# Patient Record
Sex: MALE | Race: WHITE | Employment: OTHER | URBAN - METROPOLITAN AREA
[De-identification: names, ages, dates, MRNs, and addresses within clinical notes are randomized per-mention and may not be internally consistent; named-entity substitution may affect disease eponyms.]

---

## 2017-05-16 ENCOUNTER — LAB REQUISITION (OUTPATIENT)
Dept: LAB | Facility: HOSPITAL | Age: 82
End: 2017-05-16
Payer: MEDICARE

## 2017-05-16 DIAGNOSIS — R73.01 IMPAIRED FASTING GLUCOSE: ICD-10-CM

## 2017-05-16 DIAGNOSIS — E78.2 MIXED HYPERLIPIDEMIA: ICD-10-CM

## 2017-05-16 DIAGNOSIS — E55.9 VITAMIN D DEFICIENCY: ICD-10-CM

## 2017-05-16 PROCEDURE — 85025 COMPLETE CBC W/AUTO DIFF WBC: CPT | Performed by: INTERNAL MEDICINE

## 2017-05-16 PROCEDURE — 80061 LIPID PANEL: CPT | Performed by: INTERNAL MEDICINE

## 2017-05-16 PROCEDURE — 80053 COMPREHEN METABOLIC PANEL: CPT | Performed by: INTERNAL MEDICINE

## 2017-05-16 PROCEDURE — 83036 HEMOGLOBIN GLYCOSYLATED A1C: CPT | Performed by: INTERNAL MEDICINE

## 2017-05-16 PROCEDURE — 82306 VITAMIN D 25 HYDROXY: CPT | Performed by: INTERNAL MEDICINE

## 2017-05-16 PROCEDURE — 84443 ASSAY THYROID STIM HORMONE: CPT | Performed by: INTERNAL MEDICINE

## 2017-06-28 ENCOUNTER — PRIOR ORIGINAL RECORDS (OUTPATIENT)
Dept: OTHER | Age: 82
End: 2017-06-28

## 2017-08-03 ENCOUNTER — PRIOR ORIGINAL RECORDS (OUTPATIENT)
Dept: OTHER | Age: 82
End: 2017-08-03

## 2017-08-03 ENCOUNTER — MYAURORA ACCOUNT LINK (OUTPATIENT)
Dept: OTHER | Age: 82
End: 2017-08-03

## 2017-08-09 ENCOUNTER — PRIOR ORIGINAL RECORDS (OUTPATIENT)
Dept: OTHER | Age: 82
End: 2017-08-09

## 2017-11-21 ENCOUNTER — LAB REQUISITION (OUTPATIENT)
Dept: LAB | Facility: HOSPITAL | Age: 82
End: 2017-11-21
Payer: MEDICARE

## 2017-11-21 ENCOUNTER — PRIOR ORIGINAL RECORDS (OUTPATIENT)
Dept: OTHER | Age: 82
End: 2017-11-21

## 2017-11-21 DIAGNOSIS — N18.30 CHRONIC KIDNEY DISEASE, STAGE III (MODERATE) (HCC): ICD-10-CM

## 2017-11-21 DIAGNOSIS — R73.01 IMPAIRED FASTING GLUCOSE: ICD-10-CM

## 2017-11-21 DIAGNOSIS — E78.2 MIXED HYPERLIPIDEMIA: ICD-10-CM

## 2017-11-21 PROCEDURE — 80048 BASIC METABOLIC PNL TOTAL CA: CPT | Performed by: INTERNAL MEDICINE

## 2017-11-21 PROCEDURE — 83036 HEMOGLOBIN GLYCOSYLATED A1C: CPT | Performed by: INTERNAL MEDICINE

## 2017-11-21 PROCEDURE — 84460 ALANINE AMINO (ALT) (SGPT): CPT | Performed by: INTERNAL MEDICINE

## 2017-11-21 PROCEDURE — 80061 LIPID PANEL: CPT | Performed by: INTERNAL MEDICINE

## 2017-12-19 LAB
ALT (SGPT): 17 U/L
BUN: 19 MG/DL
CALCIUM: 9 MG/DL
CHLORIDE: 111 MEQ/L
CHOLESTEROL, TOTAL: 115 MG/DL
CREATININE, SERUM: 1.24 MG/DL
GLUCOSE: 119 MG/DL
GLUCOSE: 119 MG/DL
HDL CHOLESTEROL: 26 MG/DL
LDL CHOLESTEROL: 44 MG/DL
POTASSIUM, SERUM: 3.9 MEQ/L
SGPT (ALT): 17 IU/L
SODIUM: 143 MEQ/L
TRIGLYCERIDES: 223 MG/DL

## 2017-12-20 ENCOUNTER — PRIOR ORIGINAL RECORDS (OUTPATIENT)
Dept: OTHER | Age: 82
End: 2017-12-20

## 2018-04-19 ENCOUNTER — LAB REQUISITION (OUTPATIENT)
Dept: LAB | Facility: HOSPITAL | Age: 83
End: 2018-04-19
Payer: MEDICARE

## 2018-04-19 DIAGNOSIS — Z01.89 ENCOUNTER FOR OTHER SPECIFIED SPECIAL EXAMINATIONS: ICD-10-CM

## 2018-04-19 PROCEDURE — 88305 TISSUE EXAM BY PATHOLOGIST: CPT | Performed by: UROLOGY

## 2018-05-22 ENCOUNTER — PRIOR ORIGINAL RECORDS (OUTPATIENT)
Dept: OTHER | Age: 83
End: 2018-05-22

## 2018-05-22 ENCOUNTER — LAB REQUISITION (OUTPATIENT)
Dept: LAB | Facility: HOSPITAL | Age: 83
End: 2018-05-22
Payer: MEDICARE

## 2018-05-22 DIAGNOSIS — N18.30 CHRONIC KIDNEY DISEASE, STAGE III (MODERATE) (HCC): ICD-10-CM

## 2018-05-22 PROCEDURE — 81001 URINALYSIS AUTO W/SCOPE: CPT | Performed by: INTERNAL MEDICINE

## 2018-05-22 PROCEDURE — 82043 UR ALBUMIN QUANTITATIVE: CPT | Performed by: INTERNAL MEDICINE

## 2018-05-22 PROCEDURE — 82570 ASSAY OF URINE CREATININE: CPT | Performed by: INTERNAL MEDICINE

## 2018-05-25 ENCOUNTER — LAB REQUISITION (OUTPATIENT)
Dept: LAB | Facility: HOSPITAL | Age: 83
End: 2018-05-25
Payer: MEDICARE

## 2018-05-25 DIAGNOSIS — N39.0 URINARY TRACT INFECTION: ICD-10-CM

## 2018-05-25 PROCEDURE — 87086 URINE CULTURE/COLONY COUNT: CPT | Performed by: INTERNAL MEDICINE

## 2018-06-15 LAB
ALBUMIN: 3.9 G/DL
ALKALINE PHOSPHATATE(ALK PHOS): 49 IU/L
ALT (SGPT): 15 U/L
AST (SGOT): 23 U/L
BILIRUBIN TOTAL: 0.8 MG/DL
BUN: 18 MG/DL
CALCIUM: 9.1 MG/DL
CHLORIDE: 111 MEQ/L
CHOLESTEROL, TOTAL: 120 MG/DL
CREATININE, SERUM: 1.2 MG/DL
GLOBULIN: 3.6 G/DL
GLUCOSE: 110 MG/DL
GLUCOSE: 110 MG/DL
HDL CHOLESTEROL: 28 MG/DL
LDL CHOLESTEROL: 41 MG/DL
NON-HDL CHOLESTEROL: 92 MG/DL
POTASSIUM, SERUM: 4.1 MEQ/L
PROTEIN, TOTAL: 7.5 G/DL
SGOT (AST): 23 IU/L
SGPT (ALT): 15 IU/L
SODIUM: 142 MEQ/L
THYROID STIMULATING HORMONE: 0.69 MLU/L
TRIGLYCERIDES: 253 MG/DL

## 2018-06-18 ENCOUNTER — PRIOR ORIGINAL RECORDS (OUTPATIENT)
Dept: OTHER | Age: 83
End: 2018-06-18

## 2018-11-29 ENCOUNTER — HOSPITAL ENCOUNTER (OUTPATIENT)
Dept: GENERAL RADIOLOGY | Facility: HOSPITAL | Age: 83
Discharge: HOME | End: 2018-11-29
Attending: INTERNAL MEDICINE
Payer: MEDICARE

## 2018-11-29 DIAGNOSIS — R06.00 DYSPNEA: ICD-10-CM

## 2018-11-29 PROCEDURE — 71046 X-RAY EXAM CHEST 2 VIEWS: CPT | Performed by: INTERNAL MEDICINE

## 2018-12-03 ENCOUNTER — LAB ENCOUNTER (OUTPATIENT)
Dept: LAB | Age: 83
End: 2018-12-03
Attending: INTERNAL MEDICINE
Payer: MEDICARE

## 2018-12-03 DIAGNOSIS — R73.01 IMPAIRED FASTING BLOOD SUGAR: ICD-10-CM

## 2018-12-03 DIAGNOSIS — E78.2 MIXED HYPERLIPIDEMIA: Primary | ICD-10-CM

## 2018-12-03 PROCEDURE — 84460 ALANINE AMINO (ALT) (SGPT): CPT

## 2018-12-03 PROCEDURE — 80061 LIPID PANEL: CPT

## 2018-12-03 PROCEDURE — 36415 COLL VENOUS BLD VENIPUNCTURE: CPT

## 2018-12-03 PROCEDURE — 83036 HEMOGLOBIN GLYCOSYLATED A1C: CPT

## 2018-12-03 PROCEDURE — 84450 TRANSFERASE (AST) (SGOT): CPT

## 2018-12-03 PROCEDURE — 80048 BASIC METABOLIC PNL TOTAL CA: CPT

## 2018-12-07 ENCOUNTER — HOSPITAL ENCOUNTER (OUTPATIENT)
Dept: NUCLEAR MEDICINE | Facility: HOSPITAL | Age: 83
Discharge: HOME | End: 2018-12-07
Attending: INTERNAL MEDICINE
Payer: MEDICARE

## 2018-12-07 ENCOUNTER — HOSPITAL ENCOUNTER (OUTPATIENT)
Dept: CV DIAGNOSTICS | Facility: HOSPITAL | Age: 83
Discharge: HOME | End: 2018-12-07
Attending: INTERNAL MEDICINE
Payer: MEDICARE

## 2018-12-07 ENCOUNTER — HOSPITAL ENCOUNTER (OUTPATIENT)
Dept: RESPIRATORY THERAPY | Facility: HOSPITAL | Age: 83
Discharge: HOME | End: 2018-12-07
Attending: INTERNAL MEDICINE
Payer: MEDICARE

## 2018-12-07 DIAGNOSIS — R06.09 OTHER FORMS OF DYSPNEA: ICD-10-CM

## 2018-12-07 DIAGNOSIS — R06.00 DYSPNEA: ICD-10-CM

## 2018-12-07 PROCEDURE — 78452 HT MUSCLE IMAGE SPECT MULT: CPT | Performed by: INTERNAL MEDICINE

## 2018-12-07 PROCEDURE — 94729 DIFFUSING CAPACITY: CPT

## 2018-12-07 PROCEDURE — 93018 CV STRESS TEST I&R ONLY: CPT | Performed by: INTERNAL MEDICINE

## 2018-12-07 PROCEDURE — 93016 CV STRESS TEST SUPVJ ONLY: CPT | Performed by: INTERNAL MEDICINE

## 2018-12-07 PROCEDURE — 94726 PLETHYSMOGRAPHY LUNG VOLUMES: CPT

## 2018-12-07 PROCEDURE — A4216 STERILE WATER/SALINE, 10 ML: HCPCS

## 2018-12-07 PROCEDURE — 94060 EVALUATION OF WHEEZING: CPT

## 2018-12-07 PROCEDURE — 93017 CV STRESS TEST TRACING ONLY: CPT | Performed by: INTERNAL MEDICINE

## 2018-12-07 RX ORDER — 0.9 % SODIUM CHLORIDE 0.9 %
VIAL (ML) INJECTION
Status: COMPLETED
Start: 2018-12-07 | End: 2018-12-07

## 2018-12-07 RX ADMIN — 0.9 % SODIUM CHLORIDE: 0.9 % VIAL (ML) INJECTION at 13:48:00

## 2018-12-07 NOTE — PROCEDURES
Pulmonary Function Test Results     Impression: Normal spirometry and lung volumes. The DLCO is moderately reduced at 40% of predicted. Clinical correlation is advised.

## 2018-12-10 ENCOUNTER — PRIOR ORIGINAL RECORDS (OUTPATIENT)
Dept: OTHER | Age: 83
End: 2018-12-10

## 2018-12-14 ENCOUNTER — HOSPITAL ENCOUNTER (OUTPATIENT)
Dept: CT IMAGING | Facility: HOSPITAL | Age: 83
Discharge: HOME | End: 2018-12-14
Attending: INTERNAL MEDICINE
Payer: MEDICARE

## 2018-12-14 DIAGNOSIS — R93.89 ABNORMAL CXR: ICD-10-CM

## 2018-12-14 PROCEDURE — 82565 ASSAY OF CREATININE: CPT

## 2018-12-14 PROCEDURE — 71260 CT THORAX DX C+: CPT | Performed by: INTERNAL MEDICINE

## 2018-12-19 ENCOUNTER — MYAURORA ACCOUNT LINK (OUTPATIENT)
Dept: OTHER | Age: 83
End: 2018-12-19

## 2018-12-19 ENCOUNTER — PRIOR ORIGINAL RECORDS (OUTPATIENT)
Dept: OTHER | Age: 83
End: 2018-12-19

## 2018-12-26 PROBLEM — J96.11 CHRONIC RESPIRATORY FAILURE WITH HYPOXIA (HCC): Status: ACTIVE | Noted: 2018-12-26

## 2018-12-26 PROBLEM — J43.9 PULMONARY EMPHYSEMA, UNSPECIFIED EMPHYSEMA TYPE (HCC): Status: ACTIVE | Noted: 2018-12-26

## 2018-12-26 PROBLEM — R91.1 PULMONARY NODULE: Status: ACTIVE | Noted: 2018-12-26

## 2019-01-01 ENCOUNTER — APPOINTMENT (OUTPATIENT)
Dept: CARDIAC REHAB | Facility: HOSPITAL | Age: 84
End: 2019-01-01
Attending: INTERNAL MEDICINE
Payer: MEDICARE

## 2019-01-01 ENCOUNTER — APPOINTMENT (OUTPATIENT)
Dept: GENERAL RADIOLOGY | Facility: HOSPITAL | Age: 84
End: 2019-01-01
Attending: EMERGENCY MEDICINE
Payer: MEDICARE

## 2019-01-01 ENCOUNTER — HOSPITAL ENCOUNTER (EMERGENCY)
Facility: HOSPITAL | Age: 84
Discharge: HOME | End: 2019-01-01
Attending: EMERGENCY MEDICINE
Payer: MEDICARE

## 2019-01-01 ENCOUNTER — APPOINTMENT (OUTPATIENT)
Dept: GENERAL RADIOLOGY | Facility: HOSPITAL | Age: 84
DRG: 251 | End: 2019-01-01
Attending: EMERGENCY MEDICINE
Payer: MEDICARE

## 2019-01-01 ENCOUNTER — HOSPITAL ENCOUNTER (INPATIENT)
Facility: HOSPITAL | Age: 84
LOS: 2 days | Discharge: HOME | DRG: 251 | End: 2019-01-01
Attending: EMERGENCY MEDICINE | Admitting: INTERNAL MEDICINE
Payer: MEDICARE

## 2019-01-01 ENCOUNTER — APPOINTMENT (OUTPATIENT)
Dept: INTERVENTIONAL RADIOLOGY/VASCULAR | Facility: HOSPITAL | Age: 84
DRG: 251 | End: 2019-01-01
Attending: INTERNAL MEDICINE
Payer: MEDICARE

## 2019-01-01 ENCOUNTER — TELEPHONE (OUTPATIENT)
Dept: CARDIAC REHAB | Facility: HOSPITAL | Age: 84
End: 2019-01-01

## 2019-01-01 ENCOUNTER — EXTERNAL RECORD (OUTPATIENT)
Dept: HEALTH INFORMATION MANAGEMENT | Facility: OTHER | Age: 84
End: 2019-01-01

## 2019-01-01 VITALS
DIASTOLIC BLOOD PRESSURE: 60 MMHG | SYSTOLIC BLOOD PRESSURE: 110 MMHG | OXYGEN SATURATION: 96 % | WEIGHT: 230 LBS | BODY MASS INDEX: 30.48 KG/M2 | RESPIRATION RATE: 18 BRPM | HEIGHT: 73 IN | HEART RATE: 63 BPM | TEMPERATURE: 98 F

## 2019-01-01 VITALS
DIASTOLIC BLOOD PRESSURE: 80 MMHG | WEIGHT: 237 LBS | HEART RATE: 70 BPM | BODY MASS INDEX: 31.41 KG/M2 | OXYGEN SATURATION: 94 % | TEMPERATURE: 99 F | RESPIRATION RATE: 18 BRPM | HEIGHT: 73 IN | SYSTOLIC BLOOD PRESSURE: 130 MMHG

## 2019-01-01 VITALS
TEMPERATURE: 98 F | RESPIRATION RATE: 18 BRPM | DIASTOLIC BLOOD PRESSURE: 67 MMHG | SYSTOLIC BLOOD PRESSURE: 138 MMHG | HEART RATE: 68 BPM | WEIGHT: 238.31 LBS | BODY MASS INDEX: 31 KG/M2 | OXYGEN SATURATION: 93 %

## 2019-01-01 DIAGNOSIS — R53.1 WEAKNESS GENERALIZED: Primary | ICD-10-CM

## 2019-01-01 DIAGNOSIS — J44.1 COPD EXACERBATION (HCC): Primary | ICD-10-CM

## 2019-01-01 DIAGNOSIS — I21.4 NSTEMI (NON-ST ELEVATED MYOCARDIAL INFARCTION) (HCC): ICD-10-CM

## 2019-01-01 DIAGNOSIS — M25.552 HIP PAIN, LEFT: Primary | ICD-10-CM

## 2019-01-01 PROCEDURE — 85027 COMPLETE CBC AUTOMATED: CPT | Performed by: INTERNAL MEDICINE

## 2019-01-01 PROCEDURE — 94640 AIRWAY INHALATION TREATMENT: CPT

## 2019-01-01 PROCEDURE — 80048 BASIC METABOLIC PNL TOTAL CA: CPT | Performed by: INTERNAL MEDICINE

## 2019-01-01 PROCEDURE — 93005 ELECTROCARDIOGRAM TRACING: CPT

## 2019-01-01 PROCEDURE — 92978 ENDOLUMINL IVUS OCT C 1ST: CPT

## 2019-01-01 PROCEDURE — B2111ZZ FLUOROSCOPY OF MULTIPLE CORONARY ARTERIES USING LOW OSMOLAR CONTRAST: ICD-10-PCS | Performed by: INTERNAL MEDICINE

## 2019-01-01 PROCEDURE — 85347 COAGULATION TIME ACTIVATED: CPT

## 2019-01-01 PROCEDURE — 85730 THROMBOPLASTIN TIME PARTIAL: CPT | Performed by: EMERGENCY MEDICINE

## 2019-01-01 PROCEDURE — 73502 X-RAY EXAM HIP UNI 2-3 VIEWS: CPT | Performed by: EMERGENCY MEDICINE

## 2019-01-01 PROCEDURE — 93798 PHYS/QHP OP CAR RHAB W/ECG: CPT

## 2019-01-01 PROCEDURE — 96365 THER/PROPH/DIAG IV INF INIT: CPT

## 2019-01-01 PROCEDURE — 99152 MOD SED SAME PHYS/QHP 5/>YRS: CPT

## 2019-01-01 PROCEDURE — 99285 EMERGENCY DEPT VISIT HI MDM: CPT

## 2019-01-01 PROCEDURE — 71045 X-RAY EXAM CHEST 1 VIEW: CPT | Performed by: EMERGENCY MEDICINE

## 2019-01-01 PROCEDURE — B2131ZZ FLUOROSCOPY OF MULTIPLE CORONARY ARTERY BYPASS GRAFTS USING LOW OSMOLAR CONTRAST: ICD-10-PCS | Performed by: INTERNAL MEDICINE

## 2019-01-01 PROCEDURE — 80048 BASIC METABOLIC PNL TOTAL CA: CPT | Performed by: EMERGENCY MEDICINE

## 2019-01-01 PROCEDURE — 83880 ASSAY OF NATRIURETIC PEPTIDE: CPT | Performed by: EMERGENCY MEDICINE

## 2019-01-01 PROCEDURE — 99284 EMERGENCY DEPT VISIT MOD MDM: CPT

## 2019-01-01 PROCEDURE — 93010 ELECTROCARDIOGRAM REPORT: CPT | Performed by: EMERGENCY MEDICINE

## 2019-01-01 PROCEDURE — B2151ZZ FLUOROSCOPY OF LEFT HEART USING LOW OSMOLAR CONTRAST: ICD-10-PCS | Performed by: INTERNAL MEDICINE

## 2019-01-01 PROCEDURE — 96372 THER/PROPH/DIAG INJ SC/IM: CPT

## 2019-01-01 PROCEDURE — 85025 COMPLETE CBC W/AUTO DIFF WBC: CPT | Performed by: EMERGENCY MEDICINE

## 2019-01-01 PROCEDURE — 80061 LIPID PANEL: CPT | Performed by: EMERGENCY MEDICINE

## 2019-01-01 PROCEDURE — 99291 CRITICAL CARE FIRST HOUR: CPT

## 2019-01-01 PROCEDURE — B240ZZ3 ULTRASONOGRAPHY OF SINGLE CORONARY ARTERY, INTRAVASCULAR: ICD-10-PCS | Performed by: INTERNAL MEDICINE

## 2019-01-01 PROCEDURE — 99153 MOD SED SAME PHYS/QHP EA: CPT

## 2019-01-01 PROCEDURE — 84484 ASSAY OF TROPONIN QUANT: CPT | Performed by: EMERGENCY MEDICINE

## 2019-01-01 PROCEDURE — 85025 COMPLETE CBC W/AUTO DIFF WBC: CPT

## 2019-01-01 PROCEDURE — 93459 L HRT ART/GRFT ANGIO: CPT

## 2019-01-01 PROCEDURE — B2181ZZ FLUOROSCOPY OF LEFT INTERNAL MAMMARY BYPASS GRAFT USING LOW OSMOLAR CONTRAST: ICD-10-PCS | Performed by: INTERNAL MEDICINE

## 2019-01-01 PROCEDURE — 02703ZZ DILATION OF CORONARY ARTERY, ONE ARTERY, PERCUTANEOUS APPROACH: ICD-10-PCS | Performed by: INTERNAL MEDICINE

## 2019-01-01 PROCEDURE — 96374 THER/PROPH/DIAG INJ IV PUSH: CPT

## 2019-01-01 PROCEDURE — 92920 PRQ TRLUML C ANGIOP 1ART&/BR: CPT

## 2019-01-01 PROCEDURE — 4A023N7 MEASUREMENT OF CARDIAC SAMPLING AND PRESSURE, LEFT HEART, PERCUTANEOUS APPROACH: ICD-10-PCS | Performed by: INTERNAL MEDICINE

## 2019-01-01 RX ORDER — ASPIRIN 81 MG/1
324 TABLET, CHEWABLE ORAL ONCE
Status: COMPLETED | OUTPATIENT
Start: 2019-01-01 | End: 2019-01-01

## 2019-01-01 RX ORDER — SODIUM CHLORIDE 9 MG/ML
INJECTION, SOLUTION INTRAVENOUS
Status: COMPLETED
Start: 2019-01-01 | End: 2019-01-01

## 2019-01-01 RX ORDER — MIDAZOLAM HYDROCHLORIDE 1 MG/ML
INJECTION INTRAMUSCULAR; INTRAVENOUS
Status: COMPLETED
Start: 2019-01-01 | End: 2019-01-01

## 2019-01-01 RX ORDER — TICAGRELOR 90 MG/1
TABLET ORAL
Qty: 180 TABLET | OUTPATIENT
Start: 2019-01-01

## 2019-01-01 RX ORDER — LIDOCAINE HYDROCHLORIDE 20 MG/ML
INJECTION, SOLUTION EPIDURAL; INFILTRATION; INTRACAUDAL; PERINEURAL
Status: COMPLETED
Start: 2019-01-01 | End: 2019-01-01

## 2019-01-01 RX ORDER — HEPARIN SODIUM 1000 [USP'U]/ML
INJECTION, SOLUTION INTRAVENOUS; SUBCUTANEOUS
Status: COMPLETED
Start: 2019-01-01 | End: 2019-01-01

## 2019-01-01 RX ORDER — ACETAMINOPHEN 325 MG/1
650 TABLET ORAL EVERY 6 HOURS PRN
Status: DISCONTINUED | OUTPATIENT
Start: 2019-01-01 | End: 2019-01-01

## 2019-01-01 RX ORDER — ASPIRIN 81 MG/1
81 TABLET ORAL DAILY
Status: DISCONTINUED | OUTPATIENT
Start: 2019-01-01 | End: 2019-01-01

## 2019-01-01 RX ORDER — LIDOCAINE 50 MG/G
1 PATCH TOPICAL EVERY 24 HOURS
Qty: 7 PATCH | Refills: 0 | Status: SHIPPED | OUTPATIENT
Start: 2019-01-01 | End: 2019-01-01

## 2019-01-01 RX ORDER — ORPHENADRINE CITRATE 100 MG/1
100 TABLET, EXTENDED RELEASE ORAL 2 TIMES DAILY
Qty: 20 TABLET | Refills: 0 | Status: SHIPPED | OUTPATIENT
Start: 2019-01-01 | End: 2019-01-01

## 2019-01-01 RX ORDER — ASPIRIN 81 MG/1
81 TABLET, CHEWABLE ORAL DAILY
Status: DISCONTINUED | OUTPATIENT
Start: 2019-01-01 | End: 2019-01-01

## 2019-01-01 RX ORDER — ATORVASTATIN CALCIUM 40 MG/1
80 TABLET, FILM COATED ORAL NIGHTLY
Status: DISCONTINUED | OUTPATIENT
Start: 2019-01-01 | End: 2019-01-01

## 2019-01-01 RX ORDER — MELOXICAM 7.5 MG/1
7.5 TABLET ORAL DAILY PRN
Qty: 7 TABLET | Refills: 0 | Status: SHIPPED | OUTPATIENT
Start: 2019-01-01 | End: 2019-01-01

## 2019-01-01 RX ORDER — PREDNISONE 20 MG/1
40 TABLET ORAL DAILY
Qty: 8 TABLET | Refills: 0 | Status: SHIPPED | OUTPATIENT
Start: 2019-01-01 | End: 2019-01-01

## 2019-01-01 RX ORDER — ISOSORBIDE MONONITRATE 30 MG/1
30 TABLET, EXTENDED RELEASE ORAL DAILY
Status: DISCONTINUED | OUTPATIENT
Start: 2019-01-01 | End: 2019-01-01

## 2019-01-01 RX ORDER — SODIUM CHLORIDE 9 MG/ML
INJECTION, SOLUTION INTRAVENOUS
Status: DISPENSED
Start: 2019-01-01 | End: 2019-01-01

## 2019-01-01 RX ORDER — PREDNISONE 20 MG/1
40 TABLET ORAL ONCE
Status: COMPLETED | OUTPATIENT
Start: 2019-01-01 | End: 2019-01-01

## 2019-01-01 RX ORDER — HEPARIN SODIUM 1000 [USP'U]/ML
5000 INJECTION, SOLUTION INTRAVENOUS; SUBCUTANEOUS ONCE
Status: COMPLETED | OUTPATIENT
Start: 2019-01-01 | End: 2019-01-01

## 2019-01-01 RX ORDER — METHYLPREDNISOLONE SODIUM SUCCINATE 125 MG/2ML
60 INJECTION, POWDER, LYOPHILIZED, FOR SOLUTION INTRAMUSCULAR; INTRAVENOUS ONCE
Status: COMPLETED | OUTPATIENT
Start: 2019-01-01 | End: 2019-01-01

## 2019-01-01 RX ORDER — ISOSORBIDE MONONITRATE 30 MG/1
30 TABLET, EXTENDED RELEASE ORAL DAILY
Qty: 30 TABLET | Refills: 5 | Status: SHIPPED | OUTPATIENT
Start: 2019-01-01

## 2019-01-01 RX ORDER — ATORVASTATIN CALCIUM 80 MG/1
80 TABLET, FILM COATED ORAL NIGHTLY
Qty: 90 TABLET | Refills: 1 | Status: SHIPPED | OUTPATIENT
Start: 2019-01-01

## 2019-01-01 RX ORDER — HEPARIN SODIUM AND DEXTROSE 10000; 5 [USP'U]/100ML; G/100ML
INJECTION INTRAVENOUS CONTINUOUS
Status: DISCONTINUED | OUTPATIENT
Start: 2019-01-01 | End: 2019-01-01 | Stop reason: ALTCHOICE

## 2019-01-01 RX ORDER — LISINOPRIL 5 MG/1
5 TABLET ORAL DAILY
Status: DISCONTINUED | OUTPATIENT
Start: 2019-01-01 | End: 2019-01-01

## 2019-01-01 RX ORDER — IPRATROPIUM BROMIDE AND ALBUTEROL SULFATE 2.5; .5 MG/3ML; MG/3ML
3 SOLUTION RESPIRATORY (INHALATION) ONCE
Status: COMPLETED | OUTPATIENT
Start: 2019-01-01 | End: 2019-01-01

## 2019-01-01 RX ORDER — DIPHENHYDRAMINE HCL 25 MG
25 CAPSULE ORAL EVERY 6 HOURS PRN
Status: DISCONTINUED | OUTPATIENT
Start: 2019-01-01 | End: 2019-01-01

## 2019-01-01 RX ORDER — KETOROLAC TROMETHAMINE 30 MG/ML
30 INJECTION, SOLUTION INTRAMUSCULAR; INTRAVENOUS ONCE
Status: COMPLETED | OUTPATIENT
Start: 2019-01-01 | End: 2019-01-01

## 2019-01-01 RX ORDER — SODIUM CHLORIDE 9 MG/ML
INJECTION, SOLUTION INTRAVENOUS CONTINUOUS
Status: DISCONTINUED | OUTPATIENT
Start: 2019-01-01 | End: 2019-01-01

## 2019-01-01 RX ORDER — IPRATROPIUM BROMIDE AND ALBUTEROL SULFATE 2.5; .5 MG/3ML; MG/3ML
3 SOLUTION RESPIRATORY (INHALATION) EVERY 8 HOURS
Status: COMPLETED | OUTPATIENT
Start: 2019-01-01 | End: 2019-01-01

## 2019-01-01 RX ORDER — HEPARIN SODIUM AND DEXTROSE 10000; 5 [USP'U]/100ML; G/100ML
1000 INJECTION INTRAVENOUS ONCE
Status: DISCONTINUED | OUTPATIENT
Start: 2019-01-01 | End: 2019-01-01

## 2019-01-01 RX ORDER — ORPHENADRINE CITRATE 30 MG/ML
60 INJECTION INTRAMUSCULAR; INTRAVENOUS ONCE
Status: COMPLETED | OUTPATIENT
Start: 2019-01-01 | End: 2019-01-01

## 2019-01-01 RX ORDER — MORPHINE SULFATE 4 MG/ML
2 INJECTION, SOLUTION INTRAMUSCULAR; INTRAVENOUS ONCE
Status: COMPLETED | OUTPATIENT
Start: 2019-01-01 | End: 2019-01-01

## 2019-01-01 RX ORDER — SODIUM CHLORIDE 0.9 % (FLUSH) 0.9 %
3 SYRINGE (ML) INJECTION AS NEEDED
Status: DISCONTINUED | OUTPATIENT
Start: 2019-01-01 | End: 2019-01-01

## 2019-01-01 RX ORDER — ASPIRIN 81 MG/1
TABLET, CHEWABLE ORAL
Status: DISPENSED
Start: 2019-01-01 | End: 2019-01-01

## 2019-01-01 RX ORDER — SODIUM CHLORIDE 9 MG/ML
INJECTION, SOLUTION INTRAVENOUS CONTINUOUS
Status: ACTIVE | OUTPATIENT
Start: 2019-01-01 | End: 2019-01-01

## 2019-01-01 RX ORDER — ATORVASTATIN CALCIUM 20 MG/1
20 TABLET, FILM COATED ORAL NIGHTLY
Status: DISCONTINUED | OUTPATIENT
Start: 2019-01-01 | End: 2019-01-01

## 2019-01-01 RX ORDER — METOPROLOL SUCCINATE 50 MG/1
50 TABLET, EXTENDED RELEASE ORAL DAILY
Status: DISCONTINUED | OUTPATIENT
Start: 2019-01-01 | End: 2019-01-01

## 2019-01-01 RX ORDER — FUROSEMIDE 10 MG/ML
20 INJECTION INTRAMUSCULAR; INTRAVENOUS ONCE
Status: COMPLETED | OUTPATIENT
Start: 2019-01-01 | End: 2019-01-01

## 2019-01-31 ENCOUNTER — APPOINTMENT (OUTPATIENT)
Dept: GENERAL RADIOLOGY | Facility: HOSPITAL | Age: 84
DRG: 247 | End: 2019-01-31
Payer: MEDICARE

## 2019-01-31 ENCOUNTER — PRIOR ORIGINAL RECORDS (OUTPATIENT)
Dept: OTHER | Age: 84
End: 2019-01-31

## 2019-01-31 ENCOUNTER — HOSPITAL ENCOUNTER (INPATIENT)
Facility: HOSPITAL | Age: 84
LOS: 2 days | Discharge: HOME | DRG: 247 | End: 2019-02-02
Attending: EMERGENCY MEDICINE | Admitting: INTERNAL MEDICINE
Payer: MEDICARE

## 2019-01-31 DIAGNOSIS — R09.02 HYPOXIA: ICD-10-CM

## 2019-01-31 DIAGNOSIS — R77.8 ELEVATED TROPONIN: ICD-10-CM

## 2019-01-31 DIAGNOSIS — J44.1 COPD EXACERBATION (HCC): Primary | ICD-10-CM

## 2019-01-31 PROBLEM — R79.89 ELEVATED TROPONIN: Status: ACTIVE | Noted: 2019-01-31

## 2019-01-31 LAB
ALBUMIN SERPL BCP-MCNC: 3.7 G/DL (ref 3.5–4.8)
ALP SERPL-CCNC: 47 U/L (ref 32–100)
ALT SERPL-CCNC: 15 U/L (ref 17–63)
ANION GAP SERPL CALC-SCNC: 10 MMOL/L (ref 0–18)
APTT PPP: 28.8 SECONDS (ref 23.2–35.3)
APTT PPP: 57.5 SECONDS (ref 23.2–35.3)
AST SERPL-CCNC: 34 U/L (ref 15–41)
BASOPHILS # BLD AUTO: 0.07 X10(3) UL (ref 0–0.2)
BASOPHILS NFR BLD AUTO: 1 %
BILIRUB DIRECT SERPL-MCNC: 0.1 MG/DL (ref 0–0.2)
BILIRUB SERPL-MCNC: 0.9 MG/DL (ref 0.3–1.2)
BNP SERPL-MCNC: 178 PG/ML (ref 0–100)
BUN SERPL-MCNC: 21 MG/DL (ref 8–20)
BUN/CREAT SERPL: 15.4 (ref 10–20)
CALCIUM SERPL-MCNC: 9 MG/DL (ref 8.5–10.5)
CHLORIDE SERPL-SCNC: 111 MMOL/L (ref 95–110)
CHOLEST SERPL-MCNC: 110 MG/DL (ref 110–200)
CO2 SERPL-SCNC: 19 MMOL/L (ref 22–32)
CREAT SERPL-MCNC: 1.36 MG/DL (ref 0.5–1.5)
DEPRECATED RDW RBC AUTO: 49.9 FL (ref 35.1–46.3)
EOSINOPHIL # BLD AUTO: 0.44 X10(3) UL (ref 0–0.7)
EOSINOPHIL NFR BLD AUTO: 6.2 %
ERYTHROCYTE [DISTWIDTH] IN BLOOD BY AUTOMATED COUNT: 13.6 % (ref 11–15)
GLUCOSE SERPL-MCNC: 141 MG/DL (ref 70–99)
HCT VFR BLD AUTO: 49 % (ref 39–53)
HDLC SERPL-MCNC: 26 MG/DL
HGB BLD-MCNC: 15.9 G/DL (ref 13–17.5)
IMM GRANULOCYTES # BLD AUTO: 0.05 X10(3) UL (ref 0–1)
IMM GRANULOCYTES NFR BLD: 0.7 %
LDLC SERPL CALC-MCNC: 36 MG/DL (ref 0–99)
LYMPHOCYTES # BLD AUTO: 1.67 X10(3) UL (ref 1–4)
LYMPHOCYTES NFR BLD AUTO: 23.7 %
MCH RBC QN AUTO: 32.4 PG (ref 26–34)
MCHC RBC AUTO-ENTMCNC: 32.4 G/DL (ref 31–37)
MCV RBC AUTO: 99.8 FL (ref 80–100)
MONOCYTES # BLD AUTO: 0.59 X10(3) UL (ref 0.1–1)
MONOCYTES NFR BLD AUTO: 8.4 %
NEUTROPHILS # BLD AUTO: 4.23 X10 (3) UL (ref 1.5–7.7)
NEUTROPHILS # BLD AUTO: 4.23 X10(3) UL (ref 1.5–7.7)
NEUTROPHILS NFR BLD AUTO: 60 %
NONHDLC SERPL-MCNC: 84 MG/DL
OSMOLALITY UR CALC.SUM OF ELEC: 295 MOSM/KG (ref 275–295)
PLATELET # BLD AUTO: 186 10(3)UL (ref 150–450)
POTASSIUM SERPL-SCNC: 4.1 MMOL/L (ref 3.3–5.1)
PROT SERPL-MCNC: 7 G/DL (ref 5.9–8.4)
RBC # BLD AUTO: 4.91 X10(6)UL (ref 3.8–5.8)
SODIUM SERPL-SCNC: 140 MMOL/L (ref 136–144)
TRIGL SERPL-MCNC: 239 MG/DL (ref 1–149)
TROPONIN I SERPL-MCNC: 0.11 NG/ML (ref ?–0.03)
WBC # BLD AUTO: 7.1 X10(3) UL (ref 4–11)

## 2019-01-31 PROCEDURE — 85025 COMPLETE CBC W/AUTO DIFF WBC: CPT | Performed by: EMERGENCY MEDICINE

## 2019-01-31 PROCEDURE — 80061 LIPID PANEL: CPT | Performed by: EMERGENCY MEDICINE

## 2019-01-31 PROCEDURE — 93010 ELECTROCARDIOGRAM REPORT: CPT | Performed by: INTERNAL MEDICINE

## 2019-01-31 PROCEDURE — 93005 ELECTROCARDIOGRAM TRACING: CPT

## 2019-01-31 PROCEDURE — 71045 X-RAY EXAM CHEST 1 VIEW: CPT | Performed by: EMERGENCY MEDICINE

## 2019-01-31 PROCEDURE — 99285 EMERGENCY DEPT VISIT HI MDM: CPT

## 2019-01-31 PROCEDURE — 83880 ASSAY OF NATRIURETIC PEPTIDE: CPT | Performed by: EMERGENCY MEDICINE

## 2019-01-31 PROCEDURE — 80076 HEPATIC FUNCTION PANEL: CPT | Performed by: EMERGENCY MEDICINE

## 2019-01-31 PROCEDURE — 84484 ASSAY OF TROPONIN QUANT: CPT | Performed by: EMERGENCY MEDICINE

## 2019-01-31 PROCEDURE — 80048 BASIC METABOLIC PNL TOTAL CA: CPT | Performed by: EMERGENCY MEDICINE

## 2019-01-31 PROCEDURE — 96374 THER/PROPH/DIAG INJ IV PUSH: CPT

## 2019-01-31 PROCEDURE — 85730 THROMBOPLASTIN TIME PARTIAL: CPT | Performed by: INTERNAL MEDICINE

## 2019-01-31 PROCEDURE — 94644 CONT INHLJ TX 1ST HOUR: CPT

## 2019-01-31 PROCEDURE — 93010 ELECTROCARDIOGRAM REPORT: CPT | Performed by: EMERGENCY MEDICINE

## 2019-01-31 PROCEDURE — 85730 THROMBOPLASTIN TIME PARTIAL: CPT | Performed by: EMERGENCY MEDICINE

## 2019-01-31 RX ORDER — SODIUM CHLORIDE 9 MG/ML
1 INJECTION, SOLUTION INTRAVENOUS CONTINUOUS
Status: DISCONTINUED | OUTPATIENT
Start: 2019-01-31 | End: 2019-02-01

## 2019-01-31 RX ORDER — HEPARIN SODIUM 1000 [USP'U]/ML
INJECTION, SOLUTION INTRAVENOUS; SUBCUTANEOUS
Status: COMPLETED
Start: 2019-01-31 | End: 2019-01-31

## 2019-01-31 RX ORDER — IPRATROPIUM BROMIDE AND ALBUTEROL SULFATE 2.5; .5 MG/3ML; MG/3ML
3 SOLUTION RESPIRATORY (INHALATION) EVERY 6 HOURS PRN
Status: DISCONTINUED | OUTPATIENT
Start: 2019-01-31 | End: 2019-02-02

## 2019-01-31 RX ORDER — LISINOPRIL 5 MG/1
5 TABLET ORAL DAILY
Status: DISCONTINUED | OUTPATIENT
Start: 2019-02-01 | End: 2019-02-02

## 2019-01-31 RX ORDER — PRAVASTATIN SODIUM 20 MG
20 TABLET ORAL NIGHTLY
Status: DISCONTINUED | OUTPATIENT
Start: 2019-01-31 | End: 2019-01-31

## 2019-01-31 RX ORDER — ALBUTEROL SULFATE 2.5 MG/3ML
5 SOLUTION RESPIRATORY (INHALATION) CONTINUOUS
Status: DISCONTINUED | OUTPATIENT
Start: 2019-01-31 | End: 2019-02-02

## 2019-01-31 RX ORDER — ASPIRIN 81 MG/1
324 TABLET, CHEWABLE ORAL ONCE
Status: COMPLETED | OUTPATIENT
Start: 2019-01-31 | End: 2019-01-31

## 2019-01-31 RX ORDER — PREDNISONE 20 MG/1
40 TABLET ORAL ONCE
Status: COMPLETED | OUTPATIENT
Start: 2019-01-31 | End: 2019-01-31

## 2019-01-31 RX ORDER — DOCUSATE SODIUM 100 MG/1
100 CAPSULE, LIQUID FILLED ORAL 2 TIMES DAILY
Status: DISCONTINUED | OUTPATIENT
Start: 2019-01-31 | End: 2019-02-02

## 2019-01-31 RX ORDER — ASPIRIN 81 MG/1
324 TABLET, CHEWABLE ORAL ONCE
Status: COMPLETED | OUTPATIENT
Start: 2019-01-31 | End: 2019-02-01

## 2019-01-31 RX ORDER — METOCLOPRAMIDE HYDROCHLORIDE 5 MG/ML
10 INJECTION INTRAMUSCULAR; INTRAVENOUS EVERY 8 HOURS PRN
Status: DISCONTINUED | OUTPATIENT
Start: 2019-01-31 | End: 2019-02-02

## 2019-01-31 RX ORDER — PRAVASTATIN SODIUM 20 MG
20 TABLET ORAL NIGHTLY
Status: DISCONTINUED | OUTPATIENT
Start: 2019-01-31 | End: 2019-02-02

## 2019-01-31 RX ORDER — ACETAMINOPHEN 325 MG/1
650 TABLET ORAL ONCE
Status: COMPLETED | OUTPATIENT
Start: 2019-01-31 | End: 2019-01-31

## 2019-01-31 RX ORDER — ACETAMINOPHEN 325 MG/1
650 TABLET ORAL EVERY 6 HOURS PRN
Status: DISCONTINUED | OUTPATIENT
Start: 2019-01-31 | End: 2019-02-02

## 2019-01-31 RX ORDER — POLYETHYLENE GLYCOL 3350 17 G/17G
17 POWDER, FOR SOLUTION ORAL DAILY PRN
Status: DISCONTINUED | OUTPATIENT
Start: 2019-01-31 | End: 2019-02-02

## 2019-01-31 RX ORDER — PREDNISONE 20 MG/1
40 TABLET ORAL
Status: DISCONTINUED | OUTPATIENT
Start: 2019-02-01 | End: 2019-02-02

## 2019-01-31 RX ORDER — HEPARIN SODIUM 10000 [USP'U]/100ML
INJECTION, SOLUTION INTRAVENOUS
Status: COMPLETED
Start: 2019-01-31 | End: 2019-01-31

## 2019-01-31 RX ORDER — ALBUTEROL SULFATE 90 UG/1
2 AEROSOL, METERED RESPIRATORY (INHALATION) EVERY 6 HOURS PRN
Status: DISCONTINUED | OUTPATIENT
Start: 2019-01-31 | End: 2019-02-02

## 2019-01-31 RX ORDER — METOPROLOL SUCCINATE 50 MG/1
50 TABLET, EXTENDED RELEASE ORAL DAILY
Status: DISCONTINUED | OUTPATIENT
Start: 2019-02-01 | End: 2019-02-02

## 2019-01-31 RX ORDER — HEPARIN SODIUM AND DEXTROSE 10000; 5 [USP'U]/100ML; G/100ML
INJECTION INTRAVENOUS CONTINUOUS
Status: DISCONTINUED | OUTPATIENT
Start: 2019-01-31 | End: 2019-02-01

## 2019-01-31 RX ORDER — SODIUM PHOSPHATE, DIBASIC AND SODIUM PHOSPHATE, MONOBASIC 7; 19 G/133ML; G/133ML
1 ENEMA RECTAL ONCE AS NEEDED
Status: DISCONTINUED | OUTPATIENT
Start: 2019-01-31 | End: 2019-02-02

## 2019-01-31 RX ORDER — CHLORHEXIDINE GLUCONATE 4 G/100ML
30 SOLUTION TOPICAL
Status: COMPLETED | OUTPATIENT
Start: 2019-02-01 | End: 2019-02-01

## 2019-01-31 RX ORDER — ONDANSETRON 2 MG/ML
4 INJECTION INTRAMUSCULAR; INTRAVENOUS EVERY 6 HOURS PRN
Status: DISCONTINUED | OUTPATIENT
Start: 2019-01-31 | End: 2019-02-02

## 2019-01-31 RX ORDER — SODIUM CHLORIDE 9 MG/ML
INJECTION, SOLUTION INTRAVENOUS CONTINUOUS
Status: DISCONTINUED | OUTPATIENT
Start: 2019-01-31 | End: 2019-02-01

## 2019-01-31 RX ORDER — SODIUM CHLORIDE 0.9 % (FLUSH) 0.9 %
3 SYRINGE (ML) INJECTION AS NEEDED
Status: DISCONTINUED | OUTPATIENT
Start: 2019-01-31 | End: 2019-02-02

## 2019-01-31 RX ORDER — RUFINAMIDE 40 MG/ML
1 SUSPENSION ORAL DAILY
Status: DISCONTINUED | OUTPATIENT
Start: 2019-02-01 | End: 2019-02-02

## 2019-01-31 RX ORDER — HEPARIN SODIUM 1000 [USP'U]/ML
5000 INJECTION, SOLUTION INTRAVENOUS; SUBCUTANEOUS ONCE
Status: COMPLETED | OUTPATIENT
Start: 2019-01-31 | End: 2019-01-31

## 2019-01-31 RX ORDER — HEPARIN SODIUM AND DEXTROSE 10000; 5 [USP'U]/100ML; G/100ML
1000 INJECTION INTRAVENOUS ONCE
Status: COMPLETED | OUTPATIENT
Start: 2019-01-31 | End: 2019-02-01

## 2019-01-31 RX ORDER — METOPROLOL SUCCINATE 50 MG/1
50 TABLET, EXTENDED RELEASE ORAL DAILY
COMMUNITY

## 2019-01-31 RX ORDER — ASPIRIN 325 MG
325 TABLET, DELAYED RELEASE (ENTERIC COATED) ORAL DAILY
Status: DISCONTINUED | OUTPATIENT
Start: 2019-02-01 | End: 2019-02-01

## 2019-01-31 RX ORDER — BISACODYL 10 MG
10 SUPPOSITORY, RECTAL RECTAL
Status: DISCONTINUED | OUTPATIENT
Start: 2019-01-31 | End: 2019-02-02

## 2019-01-31 NOTE — ED INITIAL ASSESSMENT (HPI)
Samara Yan arrives via EMS--patient went outside to get his newspaper at the end of the driveway and felt really short of breath and \"took his breath away\"    Denies pain.  Uses 5 L 02 at home

## 2019-01-31 NOTE — HISTORICAL OFFICE NOTE
Adarsh Precise  : 10/04/1935  ACCOUNT:  [id number]  [phone]  PCP: Dr. Steph Philip     TODAY'S DATE: 2018  DICTATED BY:  [Dr. Mauricio Esparza: [Followup of .  CAD, of native vessels, Followup of Carotid artery stenosis mult vesse prescriptions see below    VITAL SIGNS: [B/P - 132/60 , Pulse - 70, Weight -    0, Height -   71 , BMI - N/A ]    CONS: well developed, well nourished. WEIGHT: BMI parameters reviewed and discussed. NECK: jugular venous pressure not elevated.  RESP: clear t [id number]     as needed                                06/29/09 Pravastatin Sodium (Or          80mg 1 by mouth before bedtime           06/29/09 Lisinopril                      5mg 1 by mouth daily                     06/29/09 Multivitamins (Oral)

## 2019-01-31 NOTE — CONSULTS
Pulmonary H&P/Consult     NAME: Randi Daly - ROOM:  - MRN: [id number] - Age: 81year old - :  10/4/1935    Date of Admission: 2019 12:37 PM  Admission Diagnosis: No admission diagnoses are documented for this encounter.     Assessment/Plan: History   Problem Relation Age of Onset   • Lung Disorder Father         emphysema   • Heart Disease Mother    • Stroke Mother    • Mental Disorder Sister    Social History    Tobacco Use      Smoking status: Former Smoker        Types: Cigarettes        Q

## 2019-01-31 NOTE — CONSULTS
[facility]    PATIENT'S NAME: Mehreen Elkins   ATTENDING PHYSICIAN: Bill Ko MD   CONSULTING PHYSICIAN: Ge Gustafson.  Nolvia Soria MD   PATIENT ACCOUNT#:   [id number]    LOCATION:  [facility] [address] [place] [address]  MEDICAL RECORD #:   [id number]       DATE OF BIRTH: the RCA. He has carotid disease and has undergone, I believe, bilateral carotid endarterectomy. He has peripheral vascular disease of the legs but does not remember there being any intervention done. Dyslipidemia, COPD.     MEDICATIONS:  Metoprolol succi subtle ST elevation there. EKG #2 shows improvement in the ST-segment depression in the precordial leads and resolution of the ST elevation in the inferior leads. Chest x-ray shows mild cardiomegaly, mild pulmonary vascular redistribution.     ASSESSMEN

## 2019-01-31 NOTE — H&P
[facility]    PATIENT'S NAME: Alexia Chen   ATTENDING PHYSICIAN: Bill Pratt MD   PATIENT ACCOUNT#:   [id number]    LOCATION:  [place] [address]  MEDICAL RECORD #:   [id number]       YOB: 1935  ADMISSION DATE:       01/31/2 is  and lives with his wife in [facility]. REVIEW OF SYSTEMS:  Patient denies frequent headaches or ophthalmologic complaint. No dysphagia, hemoptysis, hematemesis, melena, or hematochezia. No dysuria or change in frequency.   No one-sided wea obtained. Patient is planned to undergo a cardiac catheterization tomorrow after IV hydration. Further recommendations pending clinical course. Dictated By Marixa Kong MD  d: 01/31/2019 15:51:42  t: 01/31/2019 16:08:37  [facility] [id number]  [facility]

## 2019-01-31 NOTE — ED PROVIDER NOTES
Patient Seen in: [facility] AND [facility] Emergency Department    History   Patient presents with:  Dyspnea ABDIAZIZ SOB (respiratory)    Stated Complaint: SOB    HPI    81-year-old male with past medical history significant for chronic kidney disease, CAD, high cho reviewed and negative except as noted above.     Physical Exam     ED Triage Vitals   BP 01/31/19 1241 108/69   Pulse 01/31/19 1239 71   Resp 01/31/19 1239 18   Temp 01/31/19 1241 97.7 °F (36.5 °C)   Temp src 01/31/19 1239 Oral   SpO2 01/31/19 1239 (!) 87 % following components:    ALT 15 (*)     All other components within normal limits   LIPID PANEL - Abnormal; Notable for the following components:    Triglycerides 239 (*)     All other components within normal limits   CBC W/ DIFFERENTIAL - Abnormal; Notab PM         1431 -patient continues to have hypoxia given his saturations on 5 L currently are 92%. Discussed case with Dr. Gracie Flores who would like patient to be started on a short course of prednisone.   Discussed with [place] [facility] who agrees with donna

## 2019-02-01 ENCOUNTER — APPOINTMENT (OUTPATIENT)
Dept: CV DIAGNOSTICS | Facility: HOSPITAL | Age: 84
DRG: 247 | End: 2019-02-01
Attending: INTERNAL MEDICINE
Payer: MEDICARE

## 2019-02-01 ENCOUNTER — APPOINTMENT (OUTPATIENT)
Dept: INTERVENTIONAL RADIOLOGY/VASCULAR | Facility: HOSPITAL | Age: 84
DRG: 247 | End: 2019-02-01
Attending: INTERNAL MEDICINE
Payer: MEDICARE

## 2019-02-01 LAB
ANION GAP SERPL CALC-SCNC: 14 MMOL/L (ref 0–18)
APTT PPP: 50.3 SECONDS (ref 23.2–35.3)
BUN SERPL-MCNC: 23 MG/DL (ref 8–20)
BUN/CREAT SERPL: 19.3 (ref 10–20)
CALCIUM SERPL-MCNC: 9.2 MG/DL (ref 8.5–10.5)
CHLORIDE SERPL-SCNC: 105 MMOL/L (ref 95–110)
CO2 SERPL-SCNC: 20 MMOL/L (ref 22–32)
CREAT SERPL-MCNC: 1.19 MG/DL (ref 0.5–1.5)
DEPRECATED RDW RBC AUTO: 48 FL (ref 35.1–46.3)
ERYTHROCYTE [DISTWIDTH] IN BLOOD BY AUTOMATED COUNT: 13.3 % (ref 11–15)
GLUCOSE SERPL-MCNC: 150 MG/DL (ref 70–99)
HCT VFR BLD AUTO: 44 % (ref 39–53)
HGB BLD-MCNC: 14.6 G/DL (ref 13–17.5)
INR BLD: 1.1 (ref 0.9–1.2)
MCH RBC QN AUTO: 32.6 PG (ref 26–34)
MCHC RBC AUTO-ENTMCNC: 33.2 G/DL (ref 31–37)
MCV RBC AUTO: 98.2 FL (ref 80–100)
OSMOLALITY UR CALC.SUM OF ELEC: 295 MOSM/KG (ref 275–295)
PLATELET # BLD AUTO: 206 10(3)UL (ref 150–450)
PLATELET # BLD AUTO: 206 10(3)UL (ref 150–450)
POTASSIUM SERPL-SCNC: 4.6 MMOL/L (ref 3.3–5.1)
PROTHROMBIN TIME: 13.5 SECONDS (ref 11.8–14.5)
RBC # BLD AUTO: 4.48 X10(6)UL (ref 3.8–5.8)
SODIUM SERPL-SCNC: 139 MMOL/L (ref 136–144)
WBC # BLD AUTO: 10.7 X10(3) UL (ref 4–11)

## 2019-02-01 PROCEDURE — 92978 ENDOLUMINL IVUS OCT C 1ST: CPT

## 2019-02-01 PROCEDURE — 85049 AUTOMATED PLATELET COUNT: CPT | Performed by: INTERNAL MEDICINE

## 2019-02-01 PROCEDURE — 85610 PROTHROMBIN TIME: CPT | Performed by: INTERNAL MEDICINE

## 2019-02-01 PROCEDURE — 99152 MOD SED SAME PHYS/QHP 5/>YRS: CPT

## 2019-02-01 PROCEDURE — B2131ZZ FLUOROSCOPY OF MULTIPLE CORONARY ARTERY BYPASS GRAFTS USING LOW OSMOLAR CONTRAST: ICD-10-PCS | Performed by: INTERNAL MEDICINE

## 2019-02-01 PROCEDURE — 85027 COMPLETE CBC AUTOMATED: CPT | Performed by: INTERNAL MEDICINE

## 2019-02-01 PROCEDURE — 4A023N6 MEASUREMENT OF CARDIAC SAMPLING AND PRESSURE, RIGHT HEART, PERCUTANEOUS APPROACH: ICD-10-PCS | Performed by: INTERNAL MEDICINE

## 2019-02-01 PROCEDURE — 027034Z DILATION OF CORONARY ARTERY, ONE ARTERY WITH DRUG-ELUTING INTRALUMINAL DEVICE, PERCUTANEOUS APPROACH: ICD-10-PCS | Performed by: INTERNAL MEDICINE

## 2019-02-01 PROCEDURE — B24BZZ3 ULTRASONOGRAPHY OF HEART WITH AORTA, INTRAVASCULAR: ICD-10-PCS | Performed by: INTERNAL MEDICINE

## 2019-02-01 PROCEDURE — B2181ZZ FLUOROSCOPY OF LEFT INTERNAL MAMMARY BYPASS GRAFT USING LOW OSMOLAR CONTRAST: ICD-10-PCS | Performed by: INTERNAL MEDICINE

## 2019-02-01 PROCEDURE — 85730 THROMBOPLASTIN TIME PARTIAL: CPT | Performed by: INTERNAL MEDICINE

## 2019-02-01 PROCEDURE — B2111ZZ FLUOROSCOPY OF MULTIPLE CORONARY ARTERIES USING LOW OSMOLAR CONTRAST: ICD-10-PCS | Performed by: INTERNAL MEDICINE

## 2019-02-01 PROCEDURE — 99153 MOD SED SAME PHYS/QHP EA: CPT

## 2019-02-01 PROCEDURE — 94640 AIRWAY INHALATION TREATMENT: CPT

## 2019-02-01 PROCEDURE — 93461 R&L HRT ART/VENTRICLE ANGIO: CPT

## 2019-02-01 PROCEDURE — 80048 BASIC METABOLIC PNL TOTAL CA: CPT | Performed by: INTERNAL MEDICINE

## 2019-02-01 RX ORDER — ASPIRIN 81 MG/1
81 TABLET ORAL DAILY
Status: DISCONTINUED | OUTPATIENT
Start: 2019-02-01 | End: 2019-02-02

## 2019-02-01 RX ORDER — MIDAZOLAM HYDROCHLORIDE 1 MG/ML
INJECTION INTRAMUSCULAR; INTRAVENOUS
Status: COMPLETED
Start: 2019-02-01 | End: 2019-02-01

## 2019-02-01 RX ORDER — SODIUM CHLORIDE 9 MG/ML
INJECTION, SOLUTION INTRAVENOUS CONTINUOUS
Status: ACTIVE | OUTPATIENT
Start: 2019-02-01 | End: 2019-02-01

## 2019-02-01 RX ORDER — LIDOCAINE HYDROCHLORIDE 20 MG/ML
INJECTION, SOLUTION EPIDURAL; INFILTRATION; INTRACAUDAL; PERINEURAL
Status: COMPLETED
Start: 2019-02-01 | End: 2019-02-01

## 2019-02-01 NOTE — BRIEF PROCEDURE NOTE
[facility]    Brief Cardiac Cath Note  Nhi Fulling Patient Status:  Inpatient    10/4/1935 MRN [id number]   Location [facility] Attending Faina Brand MD   Hosp Day # 1 PCP Kayce Lynne MD       Cardi

## 2019-02-01 NOTE — PLAN OF CARE
Inpatient Throughput Communication:    Called inpatient RN Tracy Morris and notified of scheduled procedure cardiac cath on 2/1. Verified that appropriate consent is signed: Yes  Appropriate Consent Signed:  Yes  Access Site Hair Clipped and skin prepped: Yes

## 2019-02-01 NOTE — PROGRESS NOTES
[facility]    Progress Note    Andrade Glynn Patient Status:  Inpatient    10/4/1935 MRN [id number]   Location [facility] 3W/SW Attending Roby Yoo MD   Hosp Day # 1 PCP Jennifer Guzman MD       Subjective:   Andrade Glynn 0.11*       Recent Labs   Lab  02/01/19   0527   INR  1.1       No results for input(s): PGLU in the last 168 hours.     Creatinine   Date Value Ref Range Status   02/01/2019 1.19 0.50 - 1.50 mg/dL Final   01/31/2019 1.36 0.50 - 1.50 mg/dL Final   12/03/201 Final   05/16/2017 54 (L) >=60 Final     WBC   Date Value Ref Range Status   02/01/2019 10.7 4.0 - 11.0 x10(3) uL Final   01/31/2019 7.1 4.0 - 11.0 x10(3) uL Final   05/22/2018 8.5 4.0 - 11.0 K/UL Final   05/16/2017 7.5 4.0 - 11.0 K/UL Final     HGB   Date indeterminate ABNORMAL When compared with ECG of 01/31/2019 12:43:36 No significant changes have occurred Electronically signed on 01/31/2019 at 16:10 by José Ely MD    Ekg 12-lead    Result Date: 1/31/2019  ECG Report  Interpretation  -----------

## 2019-02-01 NOTE — PROGRESS NOTES
Procedure hand off report given to [facility]  RN. Pt's vital signs are stable. Procedural access site is dry and intact with no signs and symptoms of bleeding and hematoma. Bedrest status x 4 hrs   Dr. Lucía Santos spoke with patient/family post procedure.

## 2019-02-01 NOTE — PROGRESS NOTES
SENIOR MULTIVITAMIN PLUS TABS is Non-Formulary Medication &  Auto-Substituted to Centrum tablet Per P&T PROTOCOL

## 2019-02-01 NOTE — PAYOR COMM NOTE
--------------  ADMISSION REVIEW     Payor: Martínez Vicente Crewe #:  [id number]  Authorization Number: [id number]    Admit date: 1/31/19  Admit time: 9372       Patient Seen in: [facility] Emergency Department    History   Stated Co Glucose 141 (*)     Chloride 111 (*)     CO2 19 (*)     BUN 21 (*)     GFR, Non- 48 (*)     GFR, -American 56 (*)     All other components within normal limits   BNP (B TYPE NATRIUERTIC PEPTIDE) - Abnormal; Notable for the following Pulmonary regarding this issue. Today, however, he went out to  the newspapers and in attempting to get back into his house, he felt as though he could not breathe. His legs became heavy, his back began to hurt. He denied any fever or chills.   He this at their office visit on December 19, 2018. Since these were stable symptoms, plan was for continued medical management. A recent nuclear stress test showed only a fixed lateral wall defect but no reversible ischemia.   The ejection fraction was 60% moderate reduction of diffusion capacity. 4.       Chronic kidney disease stage 3.     PLAN:    1. Admit to telemetry. Begin IV heparin drip. Continue aspirin. 2.       Cardiac catheterization tomorrow.   We will hydrate overnight to minimize dye drug-eluting stent x1     2/2:   Dc home    MEDICATIONS ADMINISTERED IN LAST 1 DAY:  acetaminophen (TYLENOL) tab 650 mg     Date Action Dose Route User    1/31/2019 4984 Given 650 mg Oral Ginny Delgado RN      aspirin chewable tab 324 mg     Date Action Cody Gonzalez

## 2019-02-01 NOTE — PLAN OF CARE
Patient Centered Care    • Patient preferences are identified and integrated in the patient's plan of care Progressing        Patient/Family Goals    • Patient/Family Long Term Goal Progressing    • Patient/Family Short Term Goal Progressing        Pt aler

## 2019-02-01 NOTE — PROGRESS NOTES
Pulmonary Progress Note     Assessment / Plan:  1. AECOPD - precipitated by the cold weather  - prednisone po burst  - duonebs  - cont home anoro  2.  Acute on chronic hypoxic respiratory failure  - wean O2 to baseline RA at rest and 5lpm with exertion and

## 2019-02-02 ENCOUNTER — APPOINTMENT (OUTPATIENT)
Dept: CV DIAGNOSTICS | Facility: HOSPITAL | Age: 84
DRG: 247 | End: 2019-02-02
Attending: INTERNAL MEDICINE
Payer: MEDICARE

## 2019-02-02 VITALS
SYSTOLIC BLOOD PRESSURE: 142 MMHG | OXYGEN SATURATION: 93 % | RESPIRATION RATE: 20 BRPM | HEIGHT: 73 IN | TEMPERATURE: 98 F | BODY MASS INDEX: 32.56 KG/M2 | WEIGHT: 245.63 LBS | DIASTOLIC BLOOD PRESSURE: 72 MMHG | HEART RATE: 68 BPM

## 2019-02-02 LAB
ANION GAP SERPL CALC-SCNC: 11 MMOL/L (ref 0–18)
APTT PPP: 30.3 SECONDS (ref 23.2–35.3)
BUN SERPL-MCNC: 19 MG/DL (ref 8–20)
BUN/CREAT SERPL: 16.2 (ref 10–20)
CALCIUM SERPL-MCNC: 9.3 MG/DL (ref 8.5–10.5)
CHLORIDE SERPL-SCNC: 108 MMOL/L (ref 95–110)
CO2 SERPL-SCNC: 20 MMOL/L (ref 22–32)
CREAT SERPL-MCNC: 1.17 MG/DL (ref 0.5–1.5)
DEPRECATED RDW RBC AUTO: 49.3 FL (ref 35.1–46.3)
ERYTHROCYTE [DISTWIDTH] IN BLOOD BY AUTOMATED COUNT: 13.4 % (ref 11–15)
GLUCOSE SERPL-MCNC: 157 MG/DL (ref 70–99)
HCT VFR BLD AUTO: 45.7 % (ref 39–53)
HGB BLD-MCNC: 15.1 G/DL (ref 13–17.5)
MCH RBC QN AUTO: 33 PG (ref 26–34)
MCHC RBC AUTO-ENTMCNC: 33 G/DL (ref 31–37)
MCV RBC AUTO: 99.8 FL (ref 80–100)
OSMOLALITY UR CALC.SUM OF ELEC: 294 MOSM/KG (ref 275–295)
PLATELET # BLD AUTO: 207 10(3)UL (ref 150–450)
POTASSIUM SERPL-SCNC: 4.6 MMOL/L (ref 3.3–5.1)
RBC # BLD AUTO: 4.58 X10(6)UL (ref 3.8–5.8)
SODIUM SERPL-SCNC: 139 MMOL/L (ref 136–144)
WBC # BLD AUTO: 10.4 X10(3) UL (ref 4–11)

## 2019-02-02 PROCEDURE — 93306 TTE W/DOPPLER COMPLETE: CPT | Performed by: INTERNAL MEDICINE

## 2019-02-02 PROCEDURE — 80048 BASIC METABOLIC PNL TOTAL CA: CPT | Performed by: INTERNAL MEDICINE

## 2019-02-02 PROCEDURE — 85730 THROMBOPLASTIN TIME PARTIAL: CPT | Performed by: INTERNAL MEDICINE

## 2019-02-02 PROCEDURE — 85027 COMPLETE CBC AUTOMATED: CPT | Performed by: INTERNAL MEDICINE

## 2019-02-02 RX ORDER — ASPIRIN 81 MG/1
81 TABLET ORAL DAILY
Refills: 0 | Status: SHIPPED | COMMUNITY
Start: 2019-02-03

## 2019-02-02 RX ORDER — ATORVASTATIN CALCIUM 20 MG/1
20 TABLET, FILM COATED ORAL NIGHTLY
Qty: 30 TABLET | Refills: 0 | Status: ON HOLD | OUTPATIENT
Start: 2019-02-02 | End: 2019-01-01

## 2019-02-02 RX ORDER — ALBUTEROL SULFATE 90 UG/1
2 AEROSOL, METERED RESPIRATORY (INHALATION) EVERY 6 HOURS PRN
Qty: 1 INHALER | Refills: 11 | Status: SHIPPED | OUTPATIENT
Start: 2019-02-02 | End: 2019-03-04

## 2019-02-02 RX ORDER — PREDNISONE 20 MG/1
40 TABLET ORAL
Qty: 6 TABLET | Refills: 0 | Status: SHIPPED | OUTPATIENT
Start: 2019-02-03 | End: 2019-02-06

## 2019-02-02 NOTE — PROGRESS NOTES
[facility]    Progress Note    Corey Resendiz Patient Status:  Inpatient    10/4/1935 MRN [id number]   Location [facility] 3W/SW Attending Waqas Rodriguez MD   Hosp Day # 2 PCP Hyacinth Torrez MD       Subjective:   Corey Resendiz 15*   --    --    BILT  0.9   --    --    TP  7.0   --    --        No results for input(s): JAKOB NEWSOME in the last 168 hours.     Recent Labs   Lab  01/31/19   1245   TROP  0.11*       Recent Labs   Lab  02/01/19   0527   INR  1.1       No results for input( Final   05/22/2018 56 (L) >=60 Final   11/21/2017 56 (L) >=60 Final     WBC   Date Value Ref Range Status   02/02/2019 10.4 4.0 - 11.0 x10(3) uL Final   02/01/2019 10.7 4.0 - 11.0 x10(3) uL Final   01/31/2019 7.1 4.0 - 11.0 x10(3) uL Final   05/22/2018 8.5 disease or posterior fasicular block.  -Old inferior infarct.  -  Anterior infarct, age indeterminate ABNORMAL When compared with ECG of 01/31/2019 12:43:36 No significant changes have occurred Electronically signed on 01/31/2019 at 16:10 by Parag Barnes

## 2019-02-02 NOTE — PROGRESS NOTES
[facility]    Cardiology Progress Note    Para Fret Patient Status:  Inpatient    10/4/1935 MRN [id number]   Location [facility] 3W/SW Attending Mateusz Salazar MD   Hosp Day # 2 PCP Kevin Isbell MD     Primary Cardiologist 3.2 - see below    Acute on chronic hypoxic respiratory failure/ COPD  - per pulm  - steroids, nebs  - elevated PA pressures on RHC likely 2/2 to long standing pulm dx    Essential hypertension, controlled  - Continue current antihypertensives as noted abo pulmonary disease or posterior fasicular block.  -Anterior infarct -age undetermined -Old inferior infarct.  - Nonspecific T-abnormality.  ABNORMAL When compared with ECG of 05/25/2010 12:28:08 changes of anterior mi are new Electronically signed on 01/31/2

## 2019-02-02 NOTE — TRANSITION NOTE
Primary Cardiologist: Dr Najma Vazquez  Subjective:   Subjective:  81year old male who presented with NSTEMI and respiratory failure. Now s/p cath with PCI to distal LM (hx CABG).         Scheduled Meds:   • ticagrelor  90 mg Oral Q12H   • aspirin  81 mg Oral Da of left main with drug-eluting stent x1  Date of Procedure: 2/1/2019   Indication: Non-ST elevation MI  Post procedure findings:  1) Left Ventriculography: Not performed, echo available  2) Hemodynamics: RA pressure of 12 mmHg, RV pressure of 72 over 12 mm

## 2019-02-02 NOTE — PLAN OF CARE
Problem: Patient Centered Care  Goal: Patient preferences are identified and integrated in the patient's plan of care  Interventions:  - What would you like us to know as we care for you?  I live at home with wife  - Provide timely, complete, and accurate i arrhythmias  - Monitor electrolytes and administer replacement therapy as ordered  Outcome: Progressing      Comments: Pt is alert and oriented. Pt had cardiac cath yesterday accessed through right groin. Cath site is intact [see flowsheet].  Pt does not co

## 2019-02-03 ENCOUNTER — TELEPHONE (OUTPATIENT)
Dept: MEDSURG UNIT | Facility: HOSPITAL | Age: 84
End: 2019-02-03

## 2019-02-10 NOTE — PROGRESS NOTES
Ema Santos 308 Patient Status:  Outpatient    10/4/1935 MRN [id number]   Location MD Dr. Darren Quiroga Dr. is a 81year old male who presents t 12:45 PM    ALT 15 (L) 01/31/2019 12:45 PM    PTT 30.3 02/02/2019 06:00 AM    INR 1.1 02/01/2019 05:27 AM    PTP 13.5 02/01/2019 05:27 AM    TSH 0.69 05/22/2018 08:40 AM    TROP 0.11 (HH) 01/31/2019 12:45 PM       Clinical labs drawn by MA: cbc, bmp,   res Pneumonia prevnar 13  Flu 11/2017 9/2018    Education:  Patient instructed at length regarding clinic procedures, hours, purpose of clinic visits, sodium restricted diet, low sodium foods, fluid restrictions, daily weights, medication regimen s/s of copd by mouth daily. , Disp: , Rfl:     Salvador Mirza, NP  2/11/19

## 2019-02-11 ENCOUNTER — OFFICE VISIT (OUTPATIENT)
Dept: CARDIOLOGY CLINIC | Facility: HOSPITAL | Age: 84
End: 2019-02-11
Attending: INTERNAL MEDICINE
Payer: MEDICARE

## 2019-02-11 ENCOUNTER — PRIOR ORIGINAL RECORDS (OUTPATIENT)
Dept: OTHER | Age: 84
End: 2019-02-11

## 2019-02-11 ENCOUNTER — MYAURORA ACCOUNT LINK (OUTPATIENT)
Dept: OTHER | Age: 84
End: 2019-02-11

## 2019-02-11 VITALS
DIASTOLIC BLOOD PRESSURE: 73 MMHG | BODY MASS INDEX: 33 KG/M2 | OXYGEN SATURATION: 92 % | WEIGHT: 251.31 LBS | HEART RATE: 72 BPM | SYSTOLIC BLOOD PRESSURE: 127 MMHG

## 2019-02-11 DIAGNOSIS — I25.10 CORONARY ARTERY DISEASE INVOLVING NATIVE CORONARY ARTERY OF NATIVE HEART WITHOUT ANGINA PECTORIS: ICD-10-CM

## 2019-02-11 DIAGNOSIS — J43.2 CENTRILOBULAR EMPHYSEMA (HCC): ICD-10-CM

## 2019-02-11 DIAGNOSIS — J96.11 CHRONIC RESPIRATORY FAILURE WITH HYPOXIA (HCC): ICD-10-CM

## 2019-02-11 DIAGNOSIS — Z95.5 S/P CORONARY ARTERY STENT PLACEMENT: ICD-10-CM

## 2019-02-11 DIAGNOSIS — I21.4 NSTEMI (NON-ST ELEVATED MYOCARDIAL INFARCTION) (HCC): ICD-10-CM

## 2019-02-11 DIAGNOSIS — J44.1 COPD EXACERBATION (HCC): Primary | ICD-10-CM

## 2019-02-11 DIAGNOSIS — J44.9 COPD (CHRONIC OBSTRUCTIVE PULMONARY DISEASE) (HCC): ICD-10-CM

## 2019-02-11 PROBLEM — I27.20 PULMONARY HTN (HCC): Chronic | Status: ACTIVE | Noted: 2019-02-11

## 2019-02-11 PROBLEM — I73.9 PAD (PERIPHERAL ARTERY DISEASE) (HCC): Chronic | Status: ACTIVE | Noted: 2019-02-11

## 2019-02-11 LAB
ANION GAP SERPL CALC-SCNC: 10 MMOL/L (ref 0–18)
BASOPHILS # BLD AUTO: 0.08 X10(3) UL (ref 0–0.2)
BASOPHILS NFR BLD AUTO: 0.8 %
BUN SERPL-MCNC: 20 MG/DL (ref 8–20)
BUN/CREAT SERPL: 16 (ref 10–20)
CALCIUM SERPL-MCNC: 9 MG/DL (ref 8.5–10.5)
CHLORIDE SERPL-SCNC: 110 MMOL/L (ref 95–110)
CO2 SERPL-SCNC: 17 MMOL/L (ref 22–32)
CREAT SERPL-MCNC: 1.25 MG/DL (ref 0.5–1.5)
DEPRECATED RDW RBC AUTO: 48.2 FL (ref 35.1–46.3)
EOSINOPHIL # BLD AUTO: 0.43 X10(3) UL (ref 0–0.7)
EOSINOPHIL NFR BLD AUTO: 4.5 %
ERYTHROCYTE [DISTWIDTH] IN BLOOD BY AUTOMATED COUNT: 13.3 % (ref 11–15)
GLUCOSE SERPL-MCNC: 109 MG/DL (ref 70–99)
HCT VFR BLD AUTO: 47.3 % (ref 39–53)
HGB BLD-MCNC: 16.1 G/DL (ref 13–17.5)
IMM GRANULOCYTES # BLD AUTO: 0.05 X10(3) UL (ref 0–1)
IMM GRANULOCYTES NFR BLD: 0.5 %
LYMPHOCYTES # BLD AUTO: 1.76 X10(3) UL (ref 1–4)
LYMPHOCYTES NFR BLD AUTO: 18.4 %
MCH RBC QN AUTO: 33.5 PG (ref 26–34)
MCHC RBC AUTO-ENTMCNC: 34 G/DL (ref 31–37)
MCV RBC AUTO: 98.3 FL (ref 80–100)
MONOCYTES # BLD AUTO: 0.94 X10(3) UL (ref 0.1–1)
MONOCYTES NFR BLD AUTO: 9.9 %
NEUTROPHILS # BLD AUTO: 6.28 X10 (3) UL (ref 1.5–7.7)
NEUTROPHILS # BLD AUTO: 6.28 X10(3) UL (ref 1.5–7.7)
NEUTROPHILS NFR BLD AUTO: 65.9 %
OSMOLALITY UR CALC.SUM OF ELEC: 287 MOSM/KG (ref 275–295)
PLATELET # BLD AUTO: 221 10(3)UL (ref 150–450)
POTASSIUM SERPL-SCNC: 4.1 MMOL/L (ref 3.3–5.1)
RBC # BLD AUTO: 4.81 X10(6)UL (ref 3.8–5.8)
SODIUM SERPL-SCNC: 137 MMOL/L (ref 136–144)
WBC # BLD AUTO: 9.5 X10(3) UL (ref 4–11)

## 2019-02-11 PROCEDURE — 99212 OFFICE O/P EST SF 10 MIN: CPT | Performed by: NURSE PRACTITIONER

## 2019-02-11 PROCEDURE — 85025 COMPLETE CBC W/AUTO DIFF WBC: CPT | Performed by: CLINICAL NURSE SPECIALIST

## 2019-02-11 PROCEDURE — 99214 OFFICE O/P EST MOD 30 MIN: CPT | Performed by: NURSE PRACTITIONER

## 2019-02-11 PROCEDURE — 80048 BASIC METABOLIC PNL TOTAL CA: CPT | Performed by: CLINICAL NURSE SPECIALIST

## 2019-02-11 PROCEDURE — 36415 COLL VENOUS BLD VENIPUNCTURE: CPT | Performed by: NURSE PRACTITIONER

## 2019-02-11 NOTE — PATIENT INSTRUCTIONS
Continue using oxygen 5 liters nasal cannula with walking/exertion and with sleep , can be off at rest    Continue all your same medications    Call if having any dizziness, lightheadedness, heart racing, palpitations, chest pain, shortness of breath, coug

## 2019-02-11 NOTE — PROGRESS NOTES
Ema Santos 308 Patient Status:  Outpatient    10/4/1935 MRN [id number]   Location MD Dr. Pro Danielson Dr. is a 81year old male who presents t 02/11/2019 02:45 PM     02/11/2019 02:45 PM    K 4.1 02/11/2019 02:45 PM     02/11/2019 02:45 PM    CO2 17 (L) 02/11/2019 02:45 PM     (H) 02/11/2019 02:45 PM    CA 9.0 02/11/2019 02:45 PM    ALB 3.7 01/31/2019 12:45 PM    ALKPHO 47 01/3 Occluded proximally  · RCA: Occluded proximally  · SVG:SVG to circumflex widely patent/SVG to OM widely patent/SVG to the right coronary artery widely patent  · LIMA: To LAD widely patent       Diagnostic tests:   CXR: 1/31/18   CONCLUSION:   1.  Mild opaci exacerbation of COPD with centrilobular emphysema and possible restrictive lung disease with pulmonary fibrosis  -RH cath pressure with severe pulm HTN, normal CI and PCWP, RA 12  -cough improving, denies wheezing, c/o hitchcock going up 15 stairs  -continued hy walking for about 30 minutes 5 days per week. Start by walking at a slow to moderate pace for 5-10 minutes 2-3 times a day. Pace your activity to prevent shortness of breath or fatigue.  Stop exercise if you develop chest pain, lightheadedness, or significa

## 2019-02-25 ENCOUNTER — ORDER TRANSCRIPTION (OUTPATIENT)
Dept: CARDIAC REHAB | Facility: HOSPITAL | Age: 84
End: 2019-02-25

## 2019-02-25 ENCOUNTER — PRIOR ORIGINAL RECORDS (OUTPATIENT)
Dept: OTHER | Age: 84
End: 2019-02-25

## 2019-02-25 DIAGNOSIS — Z98.61 S/P PTCA (PERCUTANEOUS TRANSLUMINAL CORONARY ANGIOPLASTY): Primary | ICD-10-CM

## 2019-02-26 ENCOUNTER — OFFICE VISIT (OUTPATIENT)
Dept: CARDIOLOGY CLINIC | Facility: HOSPITAL | Age: 84
End: 2019-02-26
Attending: NURSE PRACTITIONER
Payer: MEDICARE

## 2019-02-26 ENCOUNTER — CARDPULM VISIT (OUTPATIENT)
Dept: CARDIAC REHAB | Facility: HOSPITAL | Age: 84
End: 2019-02-26
Attending: INTERNAL MEDICINE
Payer: MEDICARE

## 2019-02-26 VITALS
SYSTOLIC BLOOD PRESSURE: 130 MMHG | HEART RATE: 62 BPM | DIASTOLIC BLOOD PRESSURE: 65 MMHG | WEIGHT: 241 LBS | BODY MASS INDEX: 32 KG/M2 | OXYGEN SATURATION: 96 %

## 2019-02-26 DIAGNOSIS — Z98.61 S/P PTCA (PERCUTANEOUS TRANSLUMINAL CORONARY ANGIOPLASTY): ICD-10-CM

## 2019-02-26 DIAGNOSIS — R06.00 DYSPNEA ON EXERTION: ICD-10-CM

## 2019-02-26 DIAGNOSIS — I27.20 PULMONARY HTN (HCC): ICD-10-CM

## 2019-02-26 DIAGNOSIS — Z95.5 S/P CORONARY ARTERY STENT PLACEMENT: ICD-10-CM

## 2019-02-26 DIAGNOSIS — J96.11 CHRONIC RESPIRATORY FAILURE WITH HYPOXIA (HCC): ICD-10-CM

## 2019-02-26 DIAGNOSIS — J43.2 CENTRILOBULAR EMPHYSEMA (HCC): Primary | ICD-10-CM

## 2019-02-26 PROCEDURE — 99211 OFF/OP EST MAY X REQ PHY/QHP: CPT | Performed by: NURSE PRACTITIONER

## 2019-02-26 PROCEDURE — 99213 OFFICE O/P EST LOW 20 MIN: CPT | Performed by: NURSE PRACTITIONER

## 2019-02-26 NOTE — PROGRESS NOTES
Ema Santos 308 Patient Status:  Outpatient    10/4/1935 MRN [id number]   Location MD Dr. Love Fox Dr. is a 81year old male who presents t 02/11/2019 02:45 PM    .0 02/11/2019 02:45 PM    CREATSERUM 1.25 02/11/2019 02:45 PM    BUN 20 02/11/2019 02:45 PM     02/11/2019 02:45 PM    K 4.1 02/11/2019 02:45 PM     02/11/2019 02:45 PM    CO2 17 (L) 02/11/2019 02:45 PM     Coronaries:  · LMCA: Diffuse and ostial 80% stenosis  · LAD: Occluded proximally after origin of 2 large diagonal branches  · CX: Occluded proximally  · RCA: Occluded proximally  · SVG:SVG to circumflex widely patent/SVG to OM widely patent/SVG to the righ providing counseling, coordination of care and education given. Patient receptive.     Assessment:  Acute exacerbation of COPD with centrilobular emphysema and possible restrictive lung disease with pulmonary fibrosis  -RH cath 2/1/19- severe pulm HTN, norm meats, processed meats like hotdogs, sausage, osullivan, pepperoni, soy sauce, pre-packaged rice or potatoes. Please remember to read nutrition labels for sodium content.     www.healthyeating. nhlbi.nih.gov      Exercise daily as tolerated, with goal of doing

## 2019-02-26 NOTE — PATIENT INSTRUCTIONS
Continue home oxygen at 5 liters per nasal cannula with exertion, walking and with sleep, can have oxygen off at rest    Continue all your same medications    Call if having any dizziness, lightheadedness, heart racing, palpitations, chest pain, shortness

## 2019-02-28 VITALS
RESPIRATION RATE: 18 BRPM | DIASTOLIC BLOOD PRESSURE: 71 MMHG | HEIGHT: 71 IN | HEART RATE: 62 BPM | WEIGHT: 255 LBS | SYSTOLIC BLOOD PRESSURE: 126 MMHG | BODY MASS INDEX: 35.7 KG/M2

## 2019-02-28 VITALS
SYSTOLIC BLOOD PRESSURE: 132 MMHG | OXYGEN SATURATION: 81 % | DIASTOLIC BLOOD PRESSURE: 60 MMHG | HEIGHT: 71 IN | HEART RATE: 70 BPM

## 2019-02-28 VITALS
SYSTOLIC BLOOD PRESSURE: 146 MMHG | WEIGHT: 249 LBS | DIASTOLIC BLOOD PRESSURE: 80 MMHG | HEART RATE: 68 BPM | RESPIRATION RATE: 18 BRPM | BODY MASS INDEX: 34.86 KG/M2 | HEIGHT: 71 IN

## 2019-02-28 VITALS
SYSTOLIC BLOOD PRESSURE: 130 MMHG | OXYGEN SATURATION: 91 % | BODY MASS INDEX: 33.88 KG/M2 | DIASTOLIC BLOOD PRESSURE: 68 MMHG | WEIGHT: 242 LBS | HEIGHT: 71 IN | HEART RATE: 75 BPM

## 2019-02-28 VITALS
HEART RATE: 70 BPM | WEIGHT: 253 LBS | SYSTOLIC BLOOD PRESSURE: 160 MMHG | HEIGHT: 71 IN | DIASTOLIC BLOOD PRESSURE: 70 MMHG | RESPIRATION RATE: 18 BRPM | BODY MASS INDEX: 35.42 KG/M2

## 2019-03-01 NOTE — DISCHARGE SUMMARY
[facility]    PATIENT'S NAME: Althea Moeller   ATTENDING PHYSICIAN: Kings Garrison MD   PATIENT ACCOUNT#:   [id number]    LOCATION:  [address] [place] [address] Dr #:   [id number]       YOB: 1935  ADMISSION DATE:       01/31/201 instructions. DISCHARGE DIET:  Cardiac diet. DISCHARGE ACTIVITY:  Home rest.    ARRANGEMENT FOR FUTURE CARE:  The patient to be followed by specialists as recommended and will see Dr. Clemmie Runner in 1 to 2 weeks. Dictated By Burnis Munroe Clemmie Runner, MD  d: 03/

## 2019-03-04 ENCOUNTER — CARDPULM VISIT (OUTPATIENT)
Dept: CARDIAC REHAB | Facility: HOSPITAL | Age: 84
End: 2019-03-04
Attending: INTERNAL MEDICINE
Payer: MEDICARE

## 2019-03-04 PROCEDURE — 93798 PHYS/QHP OP CAR RHAB W/ECG: CPT

## 2019-03-05 ENCOUNTER — PRIOR ORIGINAL RECORDS (OUTPATIENT)
Dept: HEALTH INFORMATION MANAGEMENT | Facility: OTHER | Age: 84
End: 2019-03-05

## 2019-03-06 ENCOUNTER — CARDPULM VISIT (OUTPATIENT)
Dept: CARDIAC REHAB | Facility: HOSPITAL | Age: 84
End: 2019-03-06
Attending: INTERNAL MEDICINE
Payer: MEDICARE

## 2019-03-06 ENCOUNTER — PRIOR ORIGINAL RECORDS (OUTPATIENT)
Dept: HEALTH INFORMATION MANAGEMENT | Facility: OTHER | Age: 84
End: 2019-03-06

## 2019-03-06 PROCEDURE — 93798 PHYS/QHP OP CAR RHAB W/ECG: CPT

## 2019-03-08 ENCOUNTER — CARDPULM VISIT (OUTPATIENT)
Dept: CARDIAC REHAB | Facility: HOSPITAL | Age: 84
End: 2019-03-08
Attending: INTERNAL MEDICINE
Payer: MEDICARE

## 2019-03-08 PROCEDURE — 93798 PHYS/QHP OP CAR RHAB W/ECG: CPT

## 2019-03-11 ENCOUNTER — CARDPULM VISIT (OUTPATIENT)
Dept: CARDIAC REHAB | Facility: HOSPITAL | Age: 84
End: 2019-03-11
Attending: INTERNAL MEDICINE
Payer: MEDICARE

## 2019-03-11 PROCEDURE — 93798 PHYS/QHP OP CAR RHAB W/ECG: CPT

## 2019-03-13 ENCOUNTER — CARDPULM VISIT (OUTPATIENT)
Dept: CARDIAC REHAB | Facility: HOSPITAL | Age: 84
End: 2019-03-13
Attending: INTERNAL MEDICINE
Payer: MEDICARE

## 2019-03-13 PROCEDURE — 93798 PHYS/QHP OP CAR RHAB W/ECG: CPT

## 2019-03-14 ENCOUNTER — HOSPITAL ENCOUNTER (OUTPATIENT)
Dept: CT IMAGING | Facility: HOSPITAL | Age: 84
Discharge: HOME | End: 2019-03-14
Attending: INTERNAL MEDICINE
Payer: MEDICARE

## 2019-03-14 DIAGNOSIS — R91.1 PULMONARY NODULE: ICD-10-CM

## 2019-03-14 PROCEDURE — 71250 CT THORAX DX C-: CPT | Performed by: INTERNAL MEDICINE

## 2019-03-14 RX ORDER — LISINOPRIL 5 MG/1
5 TABLET ORAL DAILY
COMMUNITY
Start: 2019-02-11

## 2019-03-14 RX ORDER — ALBUTEROL SULFATE 90 UG/1
AEROSOL, METERED RESPIRATORY (INHALATION)
COMMUNITY
Start: 2019-02-11

## 2019-03-14 RX ORDER — ATORVASTATIN CALCIUM 80 MG/1
80 TABLET, FILM COATED ORAL DAILY
COMMUNITY

## 2019-03-14 RX ORDER — ACETAMINOPHEN 500 MG
TABLET ORAL
COMMUNITY
Start: 2016-11-07

## 2019-03-14 RX ORDER — METOPROLOL SUCCINATE 50 MG/1
50 TABLET, EXTENDED RELEASE ORAL DAILY
COMMUNITY
Start: 2019-02-08

## 2019-03-14 NOTE — PROGRESS NOTES
Spoke with patient, informed that CT chest showed pulmonary nodules are stable which is good and that  recommends repeat imaging in 3 months which he will order at next clinic visit in May. Patient verbalizes understanding.

## 2019-03-14 NOTE — PROGRESS NOTES
Pulmonary nodules are s table, which is good.  Recommend repeat imaging in 3 months, which we will order after reviewing with him at his next clinic visit in May

## 2019-03-15 ENCOUNTER — CARDPULM VISIT (OUTPATIENT)
Dept: CARDIAC REHAB | Facility: HOSPITAL | Age: 84
End: 2019-03-15
Attending: INTERNAL MEDICINE
Payer: MEDICARE

## 2019-03-15 PROCEDURE — 93798 PHYS/QHP OP CAR RHAB W/ECG: CPT

## 2019-03-18 ENCOUNTER — CARDPULM VISIT (OUTPATIENT)
Dept: CARDIAC REHAB | Facility: HOSPITAL | Age: 84
End: 2019-03-18
Attending: INTERNAL MEDICINE
Payer: MEDICARE

## 2019-03-18 PROCEDURE — 93798 PHYS/QHP OP CAR RHAB W/ECG: CPT

## 2019-03-20 ENCOUNTER — CARDPULM VISIT (OUTPATIENT)
Dept: CARDIAC REHAB | Facility: HOSPITAL | Age: 84
End: 2019-03-20
Attending: INTERNAL MEDICINE
Payer: MEDICARE

## 2019-03-20 PROCEDURE — 93798 PHYS/QHP OP CAR RHAB W/ECG: CPT

## 2019-03-22 ENCOUNTER — CARDPULM VISIT (OUTPATIENT)
Dept: CARDIAC REHAB | Facility: HOSPITAL | Age: 84
End: 2019-03-22
Attending: INTERNAL MEDICINE
Payer: MEDICARE

## 2019-03-22 PROBLEM — I25.10 CAD (CORONARY ARTERY DISEASE): Status: ACTIVE | Noted: 2019-03-22

## 2019-03-22 PROBLEM — I65.29 CAROTID ARTERY STENOSIS: Status: ACTIVE | Noted: 2019-03-22

## 2019-03-22 PROBLEM — I70.209 STENOSIS OF FEMORAL ARTERY (CMD): Status: ACTIVE | Noted: 2019-03-22

## 2019-03-22 PROBLEM — I25.5 ISCHEMIC CARDIOMYOPATHY: Status: ACTIVE | Noted: 2019-03-22

## 2019-03-22 PROBLEM — J44.9 COPD (CHRONIC OBSTRUCTIVE PULMONARY DISEASE) (CMD): Status: ACTIVE | Noted: 2019-03-22

## 2019-03-22 PROBLEM — E78.00 PURE HYPERCHOLESTEROLEMIA: Status: ACTIVE | Noted: 2019-03-22

## 2019-03-22 PROBLEM — I73.9 PVD (PERIPHERAL VASCULAR DISEASE) (CMD): Status: ACTIVE | Noted: 2019-03-22

## 2019-03-22 PROBLEM — I21.4 NSTEMI (NON-ST ELEVATED MYOCARDIAL INFARCTION) (CMD): Status: ACTIVE | Noted: 2019-03-22

## 2019-03-22 PROCEDURE — 93798 PHYS/QHP OP CAR RHAB W/ECG: CPT

## 2019-03-22 RX ORDER — MULTIVIT-MIN/IRON/FOLIC ACID/K 18-600-40
CAPSULE ORAL
COMMUNITY
Start: 2018-06-18

## 2019-03-22 RX ORDER — DIPHENOXYLATE HYDROCHLORIDE AND ATROPINE SULFATE 2.5; .025 MG/1; MG/1
TABLET ORAL
COMMUNITY
Start: 2009-06-29 | End: 2019-10-02

## 2019-03-22 RX ORDER — PRAVASTATIN SODIUM 80 MG/1
TABLET ORAL
COMMUNITY
Start: 2009-06-29 | End: 2019-03-27 | Stop reason: ALTCHOICE

## 2019-03-22 RX ORDER — LISINOPRIL 100 %
POWDER (GRAM) MISCELLANEOUS
COMMUNITY
Start: 2009-06-29 | End: 2019-03-27 | Stop reason: SDUPTHER

## 2019-03-25 ENCOUNTER — CARDPULM VISIT (OUTPATIENT)
Dept: CARDIAC REHAB | Facility: HOSPITAL | Age: 84
End: 2019-03-25
Attending: INTERNAL MEDICINE
Payer: MEDICARE

## 2019-03-25 PROCEDURE — 93798 PHYS/QHP OP CAR RHAB W/ECG: CPT

## 2019-03-27 ENCOUNTER — CARDPULM VISIT (OUTPATIENT)
Dept: CARDIAC REHAB | Facility: HOSPITAL | Age: 84
End: 2019-03-27
Attending: INTERNAL MEDICINE
Payer: MEDICARE

## 2019-03-27 ENCOUNTER — OFFICE VISIT (OUTPATIENT)
Dept: CARDIOLOGY | Age: 84
End: 2019-03-27

## 2019-03-27 VITALS
OXYGEN SATURATION: 92 % | WEIGHT: 243 LBS | HEIGHT: 74 IN | BODY MASS INDEX: 31.18 KG/M2 | HEART RATE: 77 BPM | DIASTOLIC BLOOD PRESSURE: 66 MMHG | SYSTOLIC BLOOD PRESSURE: 144 MMHG | RESPIRATION RATE: 18 BRPM

## 2019-03-27 DIAGNOSIS — I25.10 CORONARY ARTERY DISEASE INVOLVING NATIVE CORONARY ARTERY OF NATIVE HEART WITHOUT ANGINA PECTORIS: Primary | ICD-10-CM

## 2019-03-27 DIAGNOSIS — E78.00 PURE HYPERCHOLESTEROLEMIA: ICD-10-CM

## 2019-03-27 PROCEDURE — 99214 OFFICE O/P EST MOD 30 MIN: CPT | Performed by: INTERNAL MEDICINE

## 2019-03-27 PROCEDURE — 93798 PHYS/QHP OP CAR RHAB W/ECG: CPT

## 2019-03-29 ENCOUNTER — CARDPULM VISIT (OUTPATIENT)
Dept: CARDIAC REHAB | Facility: HOSPITAL | Age: 84
End: 2019-03-29
Attending: INTERNAL MEDICINE
Payer: MEDICARE

## 2019-03-29 PROCEDURE — 93798 PHYS/QHP OP CAR RHAB W/ECG: CPT

## 2019-04-01 ENCOUNTER — CARDPULM VISIT (OUTPATIENT)
Dept: CARDIAC REHAB | Facility: HOSPITAL | Age: 84
End: 2019-04-01
Attending: INTERNAL MEDICINE
Payer: MEDICARE

## 2019-04-01 ENCOUNTER — TELEPHONE (OUTPATIENT)
Dept: CARDIOLOGY | Age: 84
End: 2019-04-01

## 2019-04-01 PROCEDURE — 93798 PHYS/QHP OP CAR RHAB W/ECG: CPT

## 2019-04-03 ENCOUNTER — CARDPULM VISIT (OUTPATIENT)
Dept: CARDIAC REHAB | Facility: HOSPITAL | Age: 84
End: 2019-04-03
Attending: INTERNAL MEDICINE
Payer: MEDICARE

## 2019-04-03 PROCEDURE — 93798 PHYS/QHP OP CAR RHAB W/ECG: CPT

## 2019-04-05 ENCOUNTER — CARDPULM VISIT (OUTPATIENT)
Dept: CARDIAC REHAB | Facility: HOSPITAL | Age: 84
End: 2019-04-05
Attending: INTERNAL MEDICINE
Payer: MEDICARE

## 2019-04-05 PROCEDURE — 93798 PHYS/QHP OP CAR RHAB W/ECG: CPT

## 2019-04-08 ENCOUNTER — CARDPULM VISIT (OUTPATIENT)
Dept: CARDIAC REHAB | Facility: HOSPITAL | Age: 84
End: 2019-04-08
Attending: INTERNAL MEDICINE
Payer: MEDICARE

## 2019-04-08 PROCEDURE — 93798 PHYS/QHP OP CAR RHAB W/ECG: CPT

## 2019-04-10 ENCOUNTER — CARDPULM VISIT (OUTPATIENT)
Dept: CARDIAC REHAB | Facility: HOSPITAL | Age: 84
End: 2019-04-10
Attending: INTERNAL MEDICINE
Payer: MEDICARE

## 2019-04-10 PROCEDURE — 93798 PHYS/QHP OP CAR RHAB W/ECG: CPT

## 2019-04-12 ENCOUNTER — CARDPULM VISIT (OUTPATIENT)
Dept: CARDIAC REHAB | Facility: HOSPITAL | Age: 84
End: 2019-04-12
Attending: INTERNAL MEDICINE
Payer: MEDICARE

## 2019-04-12 PROCEDURE — 93798 PHYS/QHP OP CAR RHAB W/ECG: CPT

## 2019-04-15 ENCOUNTER — CARDPULM VISIT (OUTPATIENT)
Dept: CARDIAC REHAB | Facility: HOSPITAL | Age: 84
End: 2019-04-15
Attending: INTERNAL MEDICINE
Payer: MEDICARE

## 2019-04-15 PROCEDURE — 93798 PHYS/QHP OP CAR RHAB W/ECG: CPT

## 2019-04-19 ENCOUNTER — CARDPULM VISIT (OUTPATIENT)
Dept: CARDIAC REHAB | Facility: HOSPITAL | Age: 84
End: 2019-04-19
Attending: INTERNAL MEDICINE
Payer: MEDICARE

## 2019-04-19 PROCEDURE — 93798 PHYS/QHP OP CAR RHAB W/ECG: CPT

## 2019-04-22 ENCOUNTER — CARDPULM VISIT (OUTPATIENT)
Dept: CARDIAC REHAB | Facility: HOSPITAL | Age: 84
End: 2019-04-22
Attending: INTERNAL MEDICINE
Payer: MEDICARE

## 2019-04-22 PROCEDURE — 93798 PHYS/QHP OP CAR RHAB W/ECG: CPT

## 2019-04-24 ENCOUNTER — CARDPULM VISIT (OUTPATIENT)
Dept: CARDIAC REHAB | Facility: HOSPITAL | Age: 84
End: 2019-04-24
Attending: INTERNAL MEDICINE
Payer: MEDICARE

## 2019-04-24 PROCEDURE — 93798 PHYS/QHP OP CAR RHAB W/ECG: CPT

## 2019-04-26 ENCOUNTER — CARDPULM VISIT (OUTPATIENT)
Dept: CARDIAC REHAB | Facility: HOSPITAL | Age: 84
End: 2019-04-26
Attending: INTERNAL MEDICINE
Payer: MEDICARE

## 2019-04-26 PROCEDURE — 93798 PHYS/QHP OP CAR RHAB W/ECG: CPT

## 2019-04-29 ENCOUNTER — CARDPULM VISIT (OUTPATIENT)
Dept: CARDIAC REHAB | Facility: HOSPITAL | Age: 84
End: 2019-04-29
Attending: INTERNAL MEDICINE
Payer: MEDICARE

## 2019-04-29 PROCEDURE — 93798 PHYS/QHP OP CAR RHAB W/ECG: CPT

## 2019-05-01 ENCOUNTER — CARDPULM VISIT (OUTPATIENT)
Dept: CARDIAC REHAB | Facility: HOSPITAL | Age: 84
End: 2019-05-01
Attending: INTERNAL MEDICINE
Payer: MEDICARE

## 2019-05-01 PROCEDURE — 93798 PHYS/QHP OP CAR RHAB W/ECG: CPT

## 2019-05-03 ENCOUNTER — CARDPULM VISIT (OUTPATIENT)
Dept: CARDIAC REHAB | Facility: HOSPITAL | Age: 84
End: 2019-05-03
Attending: INTERNAL MEDICINE
Payer: MEDICARE

## 2019-05-03 PROCEDURE — 93798 PHYS/QHP OP CAR RHAB W/ECG: CPT

## 2019-05-06 ENCOUNTER — CARDPULM VISIT (OUTPATIENT)
Dept: CARDIAC REHAB | Facility: HOSPITAL | Age: 84
End: 2019-05-06
Attending: INTERNAL MEDICINE
Payer: MEDICARE

## 2019-05-06 PROCEDURE — 93798 PHYS/QHP OP CAR RHAB W/ECG: CPT

## 2019-05-08 ENCOUNTER — CARDPULM VISIT (OUTPATIENT)
Dept: CARDIAC REHAB | Facility: HOSPITAL | Age: 84
End: 2019-05-08
Attending: INTERNAL MEDICINE
Payer: MEDICARE

## 2019-05-08 PROCEDURE — 93798 PHYS/QHP OP CAR RHAB W/ECG: CPT

## 2019-05-10 ENCOUNTER — CARDPULM VISIT (OUTPATIENT)
Dept: CARDIAC REHAB | Facility: HOSPITAL | Age: 84
End: 2019-05-10
Attending: INTERNAL MEDICINE
Payer: MEDICARE

## 2019-05-10 PROCEDURE — 93798 PHYS/QHP OP CAR RHAB W/ECG: CPT

## 2019-05-15 ENCOUNTER — APPOINTMENT (OUTPATIENT)
Dept: CARDIAC REHAB | Facility: HOSPITAL | Age: 84
End: 2019-05-15
Attending: INTERNAL MEDICINE
Payer: MEDICARE

## 2019-05-20 ENCOUNTER — CARDPULM VISIT (OUTPATIENT)
Dept: CARDIAC REHAB | Facility: HOSPITAL | Age: 84
End: 2019-05-20
Attending: INTERNAL MEDICINE
Payer: MEDICARE

## 2019-05-20 PROCEDURE — 93798 PHYS/QHP OP CAR RHAB W/ECG: CPT

## 2019-05-22 ENCOUNTER — CARDPULM VISIT (OUTPATIENT)
Dept: CARDIAC REHAB | Facility: HOSPITAL | Age: 84
End: 2019-05-22
Attending: INTERNAL MEDICINE
Payer: MEDICARE

## 2019-05-22 PROCEDURE — 93798 PHYS/QHP OP CAR RHAB W/ECG: CPT

## 2019-05-24 ENCOUNTER — CARDPULM VISIT (OUTPATIENT)
Dept: CARDIAC REHAB | Facility: HOSPITAL | Age: 84
End: 2019-05-24
Attending: INTERNAL MEDICINE
Payer: MEDICARE

## 2019-05-24 PROCEDURE — 93798 PHYS/QHP OP CAR RHAB W/ECG: CPT

## 2019-05-28 ENCOUNTER — LAB REQUISITION (OUTPATIENT)
Dept: LAB | Facility: HOSPITAL | Age: 84
End: 2019-05-28
Payer: MEDICARE

## 2019-05-28 DIAGNOSIS — R73.01 IMPAIRED FASTING GLUCOSE: ICD-10-CM

## 2019-05-28 DIAGNOSIS — E78.2 MIXED HYPERLIPIDEMIA: ICD-10-CM

## 2019-05-28 LAB
ALBUMIN SERPL-MCNC: 3.6 G/DL
ALBUMIN/GLOB SERPL: NORMAL {RATIO}
ALP SERPL-CCNC: 70 U/L
ALT SERPL-CCNC: 63 U/L
ANION GAP SERPL CALC-SCNC: NORMAL MMOL/L
AST SERPL-CCNC: 37 U/L
BILIRUB SERPL-MCNC: 0.8 MG/DL
BUN SERPL-MCNC: 20 MG/DL
BUN/CREAT SERPL: NORMAL
CALCIUM SERPL-MCNC: 9 MG/DL
CHLORIDE SERPL-SCNC: 114 MMOL/L
CO2 SERPL-SCNC: NORMAL MMOL/L
CREAT SERPL-MCNC: 1.22 MG/DL
GLOBULIN SER-MCNC: 3.9 G/DL
GLUCOSE SERPL-MCNC: 101 MG/DL
LENGTH OF FAST TIME PATIENT: NORMAL H
POTASSIUM SERPL-SCNC: 4.2 MMOL/L
PROT SERPL-MCNC: 7.5 G/DL
SODIUM SERPL-SCNC: 145 MMOL/L

## 2019-05-28 PROCEDURE — 85025 COMPLETE CBC W/AUTO DIFF WBC: CPT | Performed by: INTERNAL MEDICINE

## 2019-05-28 PROCEDURE — 80061 LIPID PANEL: CPT | Performed by: INTERNAL MEDICINE

## 2019-05-28 PROCEDURE — 84443 ASSAY THYROID STIM HORMONE: CPT | Performed by: INTERNAL MEDICINE

## 2019-05-28 PROCEDURE — 80053 COMPREHEN METABOLIC PANEL: CPT | Performed by: INTERNAL MEDICINE

## 2019-05-28 PROCEDURE — 83036 HEMOGLOBIN GLYCOSYLATED A1C: CPT | Performed by: INTERNAL MEDICINE

## 2019-05-29 ENCOUNTER — CARDPULM VISIT (OUTPATIENT)
Dept: CARDIAC REHAB | Facility: HOSPITAL | Age: 84
End: 2019-05-29
Attending: INTERNAL MEDICINE
Payer: MEDICARE

## 2019-05-29 PROCEDURE — 93798 PHYS/QHP OP CAR RHAB W/ECG: CPT

## 2019-05-30 ENCOUNTER — LAB REQUISITION (OUTPATIENT)
Dept: LAB | Facility: HOSPITAL | Age: 84
End: 2019-05-30
Payer: MEDICARE

## 2019-05-30 DIAGNOSIS — N18.30 CHRONIC KIDNEY DISEASE, STAGE III (MODERATE) (HCC): ICD-10-CM

## 2019-05-30 PROCEDURE — 82570 ASSAY OF URINE CREATININE: CPT | Performed by: INTERNAL MEDICINE

## 2019-05-30 PROCEDURE — 81003 URINALYSIS AUTO W/O SCOPE: CPT | Performed by: INTERNAL MEDICINE

## 2019-05-30 PROCEDURE — 82043 UR ALBUMIN QUANTITATIVE: CPT | Performed by: INTERNAL MEDICINE

## 2019-05-31 ENCOUNTER — CARDPULM VISIT (OUTPATIENT)
Dept: CARDIAC REHAB | Facility: HOSPITAL | Age: 84
End: 2019-05-31
Attending: INTERNAL MEDICINE
Payer: MEDICARE

## 2019-05-31 PROCEDURE — 93798 PHYS/QHP OP CAR RHAB W/ECG: CPT

## 2019-06-03 ENCOUNTER — CARDPULM VISIT (OUTPATIENT)
Dept: CARDIAC REHAB | Facility: HOSPITAL | Age: 84
End: 2019-06-03
Attending: INTERNAL MEDICINE
Payer: MEDICARE

## 2019-06-03 PROCEDURE — 93798 PHYS/QHP OP CAR RHAB W/ECG: CPT

## 2019-06-04 ENCOUNTER — TELEPHONE (OUTPATIENT)
Dept: CARDIOLOGY | Age: 84
End: 2019-06-04

## 2019-07-02 ENCOUNTER — CARDPULM VISIT (OUTPATIENT)
Dept: CARDIAC REHAB | Facility: HOSPITAL | Age: 84
End: 2019-07-02
Attending: INTERNAL MEDICINE
Payer: MEDICARE

## 2019-07-02 ENCOUNTER — HOSPITAL ENCOUNTER (OUTPATIENT)
Dept: CT IMAGING | Facility: HOSPITAL | Age: 84
Discharge: HOME | End: 2019-07-02
Attending: INTERNAL MEDICINE
Payer: MEDICARE

## 2019-07-02 DIAGNOSIS — R91.8 PULMONARY NODULES: ICD-10-CM

## 2019-07-02 PROCEDURE — 71250 CT THORAX DX C-: CPT | Performed by: INTERNAL MEDICINE

## 2019-07-09 ENCOUNTER — CARDPULM VISIT (OUTPATIENT)
Dept: CARDIAC REHAB | Facility: HOSPITAL | Age: 84
End: 2019-07-09
Attending: INTERNAL MEDICINE
Payer: MEDICARE

## 2019-07-11 ENCOUNTER — CARDPULM VISIT (OUTPATIENT)
Dept: CARDIAC REHAB | Facility: HOSPITAL | Age: 84
End: 2019-07-11
Attending: INTERNAL MEDICINE
Payer: MEDICARE

## 2019-07-16 ENCOUNTER — APPOINTMENT (OUTPATIENT)
Dept: CARDIAC REHAB | Facility: HOSPITAL | Age: 84
End: 2019-07-16
Attending: INTERNAL MEDICINE
Payer: MEDICARE

## 2019-07-18 ENCOUNTER — APPOINTMENT (OUTPATIENT)
Dept: CARDIAC REHAB | Facility: HOSPITAL | Age: 84
End: 2019-07-18
Attending: INTERNAL MEDICINE
Payer: MEDICARE

## 2019-07-23 ENCOUNTER — CARDPULM VISIT (OUTPATIENT)
Dept: CARDIAC REHAB | Facility: HOSPITAL | Age: 84
End: 2019-07-23
Attending: INTERNAL MEDICINE
Payer: MEDICARE

## 2019-07-29 NOTE — TELEPHONE ENCOUNTER
Pt is not seen a the clinic. Refill request routed to [facility] office.

## 2019-09-25 PROBLEM — R53.1 WEAKNESS GENERALIZED: Status: ACTIVE | Noted: 2019-01-01

## 2019-09-25 LAB
CHOLEST SERPL-MCNC: 90 MG/DL
CHOLEST/HDLC SERPL: 29 {RATIO}
HDLC SERPL-MCNC: NORMAL MG/DL
LDLC SERPL CALC-MCNC: 30 MG/DL
LENGTH OF FAST TIME PATIENT: NORMAL H
NONHDLC SERPL-MCNC: 61 MG/DL
TRIGL SERPL-MCNC: 154 MG/DL
VLDLC SERPL CALC-MCNC: 31 MG/DL

## 2019-09-25 PROCEDURE — 99152 MOD SED SAME PHYS/QHP 5/>YRS: CPT | Performed by: INTERNAL MEDICINE

## 2019-09-25 PROCEDURE — 92978 ENDOLUMINL IVUS OCT C 1ST: CPT | Performed by: INTERNAL MEDICINE

## 2019-09-25 PROCEDURE — 99223 1ST HOSP IP/OBS HIGH 75: CPT | Performed by: INTERNAL MEDICINE

## 2019-09-25 PROCEDURE — 93459 L HRT ART/GRFT ANGIO: CPT | Performed by: INTERNAL MEDICINE

## 2019-09-25 PROCEDURE — 92920 PRQ TRLUML C ANGIOP 1ART&/BR: CPT | Performed by: INTERNAL MEDICINE

## 2019-09-25 NOTE — ED INITIAL ASSESSMENT (HPI)
PATIENT HERE WITH C/O INCREASED SOB AND GENERALIZED WEAKNESS STARTING THIS MORNING. HX OF TWO MI'S, RECENT STENT PLACEMENT IN January PER PT. PT IS ON 5L NASAL CANULA AT HOME FOR COPD.

## 2019-09-25 NOTE — CONSULTS
[facility] Cardiology Consult Note    Keli Levee Patient Status:  Emergency    10/4/1935 MRN [id number]   Location [address] Attending Roz Taylor MD   Hosp Day # 0 PCP David Salazar MD       HPI:  81year old male essential hypertension      Past Surgical History:   Procedure Laterality Date   • BYPASS SURGERY      quadruple bypass   • CAROTID ENDARTERECTOMY     • CYSTOSCOPY,DIL BLADDER,GEN ANESTH     • TONSILLECTOMY       Family History   Problem Relation Age of On

## 2019-09-25 NOTE — CONSULTS
Pulmonary Consult     Assessment / Plan:  1. Acute on chronic respiratory failure - symptoms are concerning for ACS  - going for angiogram today  - uses supplemental oxygen 5 LPM with activity at baseline  2. Emphysema  - anoro  - bd protocol    Thanks.  Wi Stroke Mother    • Mental Disorder Sister          Exam:   09/25/19  1015 09/25/19  1115 09/25/19  1126 09/25/19  1145   BP: 117/55 135/62  116/87   Pulse: 62 67  63   Resp: 19 16  16   Temp:   97 °F (36.1 °C)    TempSrc:   Temporal    SpO2: 94% 94%  93%

## 2019-09-25 NOTE — ED NOTES
Report called to Sergei Eddy RN in cath lab. Pt stable at time of d/c to cath lab.  Pt and family aware of plan

## 2019-09-25 NOTE — ED PROVIDER NOTES
Patient Seen in: [facility] AND [facility] Emergency Department      History   Patient presents with:  Dyspnea ABDIAZIZ SOB (respiratory)    Stated Complaint: SOB, WEAKNESS    HPI    50-year-old male who is healthy with history of coronary stent as well as chronic em Current:/62   Pulse 67   Resp 16   Wt 106.6 kg   SpO2 94%   BMI 31.01 kg/m²         Physical Exam    Constitutional: Oriented to person, place, and time. Appears well-developed. No distress. Head: Normocephalic and atraumatic.    Eyes: Conjun TROPONIN I - Abnormal; Notable for the following components:    Troponin 0.617 (*)     All other components within normal limits   CBC W/ DIFFERENTIAL - Abnormal; Notable for the following components:    RDW-SD 50.0 (*)     All other components within no in the spine and both shoulders. OTHER: Surgical clips are seen in the neck. CONCLUSION:  1. Little change from January 31, 2019. 2. Borderline cardiomegaly. 3. Postop changes in the neck and chest. 4. Atherosclerosis. 5. Scarring/atelectasis.  6. D EpicACT:ED_HEARTSCORE_SF_POPUP,RunParamsURLEncoded:701%7C

## 2019-09-25 NOTE — PROCEDURES
[facility] - [facility]    [facility] Cardiac Cath Procedure Note  Kia Luu Patient Status:  Emergency    10/4/1935 MRN [id number]   Location [facility] Attending No att. providers found   Hosp Day # 0 PCP Leanna Krause imdur, continue beta blocker and intensify statin    Description of Procedure:   After written informed consent was obtained from the patient, patient was brought to the cardiac catheterization laboratory.   Patient was prepped and draped in the usual steri

## 2019-09-25 NOTE — PROGRESS NOTES
Patient transported to room. Report given to [facility]. Right groin C/D/I, no swelling, no hematoma, no bleeding.

## 2019-09-26 LAB
ABSOLUTE IMMATURE GRANULOCYTES (OFFPRE24): NORMAL
ANION GAP SERPL CALC-SCNC: 7 MMOL/L
BASO+EOS+MONOS # BLD: NORMAL 10*3/UL
BASO+EOS+MONOS NFR BLD: NORMAL %
BASOPHILS # BLD: NORMAL 10*3/UL
BASOPHILS NFR BLD: NORMAL %
BUN SERPL-MCNC: 17 MG/DL
BUN/CREAT SERPL: 14.9
CALCIUM SERPL-MCNC: 8.4 MG/DL
CHLORIDE SERPL-SCNC: 114 MMOL/L
CO2 SERPL-SCNC: 23 MMOL/L
CREAT SERPL-MCNC: 1.14 MG/DL
DIFFERENTIAL METHOD BLD: NORMAL
EOSINOPHIL # BLD: NORMAL 10*3/UL
EOSINOPHIL NFR BLD: NORMAL %
ERYTHROCYTE [DISTWIDTH] IN BLOOD: NORMAL %
GLUCOSE SERPL-MCNC: 122 MG/DL
HCT VFR BLD CALC: 39.6 %
HGB BLD-MCNC: 13.4 G/DL
IMMATURE GRANULOCYTES (OFFPRE25): NORMAL
LENGTH OF FAST TIME PATIENT: YES H
LYMPHOCYTES # BLD: NORMAL 10*3/UL
LYMPHOCYTES NFR BLD: NORMAL %
MCH RBC QN AUTO: NORMAL PG
MCHC RBC AUTO-ENTMCNC: NORMAL G/DL
MCV RBC AUTO: NORMAL FL
MONOCYTES # BLD: NORMAL 10*3/UL
MONOCYTES NFR BLD: NORMAL %
MPV (OFFPRE2): NORMAL
NEUTROPHILS # BLD: NORMAL 10*3/UL
NEUTROPHILS NFR BLD: NORMAL %
NRBC BLD MANUAL-RTO: NORMAL %
PLAT MORPH BLD: NORMAL
PLATELET # BLD: NORMAL 10*3/UL
POTASSIUM SERPL-SCNC: 4.1 MMOL/L
RBC # BLD: 3.97 10*6/UL
RBC MORPH BLD: NORMAL
SODIUM SERPL-SCNC: 144 MMOL/L
WBC # BLD: 8 10*3/UL
WBC MORPH BLD: NORMAL

## 2019-09-26 PROCEDURE — 99232 SBSQ HOSP IP/OBS MODERATE 35: CPT | Performed by: INTERNAL MEDICINE

## 2019-09-26 NOTE — PROGRESS NOTES
[facility]    Cardiology Progress Note  [facility]    Heidy Bains Patient Status:  Observation    10/4/1935 MRN [id number]   Location [facility] Attending Stephenie Spear MD   Hosp Day # 0 PCP Belen Worley Scheduled Meds:   • furosemide  20 mg Intravenous Once   • lisinopril  5 mg Oral Daily   • Metoprolol Succinate ER  50 mg Oral Daily   • umeclidinium-vilanterol  1 puff Inhalation Daily   • aspirin  81 mg Oral Daily   • atorvastatin  80 mg Oral Nightly 9/25/2019  ECG Report  Interpretation  -------------------------- Sinus Rhythm -First degree A-V block Sravan = 232 -Right bundle branch block and right axis -possible right ventricular hypertrophy Extensive anterior ST changes  -Old inferior infarct.  Haroldo Ty

## 2019-09-26 NOTE — PLAN OF CARE
S/P right groin cath. No intervention. Plan for medical management. Patient had 2 episodes of right femoral hematomas. Manual pressure held. Site soft, trace oozing noted. Will continue to monitor.      Problem: Patient Centered Care  Goal: Patient preferen balance, assess for edema, trend weights  Outcome: Progressing  Goal: Absence of cardiac arrhythmias or at baseline  Description  INTERVENTIONS:  - Continuous cardiac monitoring, monitor vital signs, obtain 12 lead EKG if indicated  - Evaluate effectivenes

## 2019-09-26 NOTE — PROGRESS NOTES
Pulmonary Progress Note     Assessment / Plan:  1. Acute on chronic respiratory failure - symptoms are concerning for ACS  - uses supplemental oxygen 5 LPM with activity at baseline  - volume management per cardiology  2. NSTEMI  - s/p LHC  - per cards  3.

## 2019-09-26 NOTE — CARDIAC REHAB
Cardiac Rehab Phase I    Activity: Resting comfortably in bed. Assisted to bathroom. Encouraged pt to sit up in chair but he stated that he is too tired  Wife at bedside    . Education:  Handouts provided and reviewed: [address]  Pt form

## 2019-09-26 NOTE — H&P
[facility]    PATIENT'S NAME: Noam Zapata   ATTENDING PHYSICIAN: Kings Lee MD   PATIENT ACCOUNT#:   [id number]    LOCATION:  [address] [place] [address] Street #:   [id number]       YOB: 1935  ADMISSION DATE:       09/25/2019 cystoscopies due to his previous bladder cancer.     MEDICATIONS:  Tylenol as needed, aspirin 81 mg a day, Lipitor 20 mg a day, vitamin D 2000 international units a day, lisinopril 5 mg a day, metoprolol ER 50 mg a day, multivitamin 1 a day, Brilinta 90 mg laboratories:  CBC unremarkable. Troponin was initially 0.91, elevating the 0.617. LDL is 30, total cholesterol 90, BUN 23, creatinine 1.47. CBC within normal limits on repeat. Chest x-ray reveals little change since January. ASSESSMENT:    1.

## 2019-09-26 NOTE — PLAN OF CARE
Problem: Patient Centered Care  Goal: Patient preferences are identified and integrated in the patient's plan of care  Description  Interventions:  - What would you like us to know as we care for you?  I have peripheral neuropathy so my feet are always co 12 lead EKG if indicated  - Evaluate effectiveness of antiarrhythmic and heart rate control medications as ordered  - Initiate emergency measures for life threatening arrhythmias  - Monitor electrolytes and administer replacement therapy as ordered  Outcom

## 2019-09-26 NOTE — PROGRESS NOTES
09/26/19 1003   Clinical Encounter Type   Visited With Patient   Routine Visit Introduction   Referral From Nurse   Referral To    Taoism Encounters   Spiritual Requests During Visit / Hospitalization [phone]

## 2019-09-26 NOTE — PLAN OF CARE
Problem: CARDIOVASCULAR - ADULT  Goal: Maintains optimal cardiac output and hemodynamic stability  Description  INTERVENTIONS:  - Monitor vital signs, rhythm, and trends  - Monitor for bleeding, hypotension and signs of decreased cardiac output  - Evalua document risk factors for pressure ulcer development  - Assess and document skin integrity  - Assess and document dressing/incision, wound bed, drain sites and surrounding tissue  - Implement wound care per orders  - Initiate isolation precautions as appro

## 2019-09-26 NOTE — PAYOR COMM NOTE
--------------  ADMISSION REVIEW     Payor: [facility] Tonasket Colden #:  [id number]  Authorization Number: [id number]    ED Provider Notes             Patient Seen in: [facility] Emergency Department      History   Patient presents with Exam    Constitutional: Oriented to person, place, and time. Appears well-developed. No distress. Head: Normocephalic and atraumatic. Eyes: Conjunctivae are normal. Pupils are equal, round, and reactive to light. Neck: Normal range of motion.  Neck s normal limits   CBC W/ DIFFERENTIAL - Abnormal; Notable for the following components:    RDW-SD 50.0 (*)     All other components within normal limits   CBC WITH DIFFERENTIAL WITH PLATELET    Narrative:      The following orders were created for panel order from January 31, 2019. 2. Borderline cardiomegaly. 3. Postop changes in the neck and chest. 4. Atherosclerosis. 5. Scarring/atelectasis. 6. Demineralization. 7. Scoliosis. 8. Osteoarthritis. Dictated by (CST):  Jayne Cisneros MD on 9/25/2019 at 9:14 melena, hematochezia. No dysuria or change in frequency. No one-sided weakness, heat or cold intolerance. The patient has had a voluntary 20-pounds weight loss since he started cardiac rehab some 7 months ago.       PHYSICAL EXAMINATION:    GENERAL:  Osiris Angles and a story concerning for acute coronary syndrome  - Reviewed 325mg of aspirin in the ED  - Starting heparin gtt now  - Plan for [facility] today                9/25 CATH NOTE    Summary of findings:  Distal LM stenosis     Left Ventriculography and hemodynamics such.     We will continue to treat him medically.     He appears to be mildly volume overloaded so we will give a dose of Lasix today.   Her graph appears to possibly have some pulmonary component as well.     Would like to watch overnight and discharged t

## 2019-09-27 PROCEDURE — 99232 SBSQ HOSP IP/OBS MODERATE 35: CPT | Performed by: NURSE PRACTITIONER

## 2019-09-27 NOTE — PROGRESS NOTES
[facility]    Progress Note    Leeroy Riojas Patient Status:  Inpatient    10/4/1935 MRN [id number]   Location [facility]5W Attending Ashish Nolen MD   Hosp Day # 2 PCP Crhistie Morataya MD       Assessment and Plan:     1.   Weak O2  CV:  Heart with regular rate and rhythm, nl S1,S2 ,no murmur  Abd: Abdomen soft, nontender, nondistended,   Ext:  no clubbing, no cyanosis,no edema  Neuro: no focal deficits  Skin: no rashes or lesions, R groin site without bruit or hematoma,  +ecchymot

## 2019-09-27 NOTE — PLAN OF CARE
Problem: Patient Centered Care  Goal: Patient preferences are identified and integrated in the patient's plan of care  Description  Interventions:  - What would you like us to know as we care for you?  I have peripheral neuropathy so my feet are always co

## 2019-09-27 NOTE — PROGRESS NOTES
[facility]    Progress Note    Berwyn Gowers Patient Status:  Inpatient    10/4/1935 MRN [id number]   Location [facility]5W Attending Dalton Zarate MD   Hosp Day # 2 PCP Dillan Luke MD       Subjective:   Berwyn Gowers is a input(s): PGLU in the last 168 hours.     Creatinine   Date Value Ref Range Status   09/26/2019 1.14 0.70 - 1.30 mg/dL Final   09/25/2019 1.47 (H) 0.70 - 1.30 mg/dL Final   05/28/2019 1.22 0.70 - 1.30 mg/dL Final   02/11/2019 1.25 0.50 - 1.50 mg/dL Final Ref Range Status   09/26/2019 8.0 4.0 - 11.0 x10(3)  Final   09/25/2019 6.8 4.0 - 11.0 x10(3)  Final   05/28/2019 8.2 4.0 - 11.0 x10(3)  Final   02/11/2019 9.5 4.0 - 11.0 x10(3)  Final   02/02/2019 10.4 4.0 - 11.0 x10(3)  Final   02/01/2019 10.7 inferior infarct.  ABNORMAL When compared with ECG of 09/25/2019 08:31:01 Anterior ST changes have improved Electronically signed on 09/25/2019 at 16:31 by Harshil Levi MD    Ekg 12-lead    Result Date: 9/25/2019  ECG Report  Interpretation  ----------

## 2019-09-27 NOTE — PROGRESS NOTES
Pulmonary Progress Note     Assessment / Plan:  1. Acute on chronic respiratory failure - resolved   - uses supplemental oxygen 5 LPM with activity at baseline  - volume management per cardiology  2. NSTEMI  - s/p LHC  - per cards  3.  Emphysema  - anoro  -

## 2019-09-30 RX ORDER — ISOSORBIDE MONONITRATE 30 MG/1
30 TABLET, EXTENDED RELEASE ORAL DAILY
COMMUNITY
Start: 2019-09-28 | End: 2019-12-16 | Stop reason: SDUPTHER

## 2019-09-30 RX ORDER — DIFLUPREDNATE OPHTHALMIC 0.5 MG/ML
EMULSION OPHTHALMIC
COMMUNITY
Start: 2019-08-13 | End: 2019-10-02

## 2019-10-02 ENCOUNTER — OFFICE VISIT (OUTPATIENT)
Dept: CARDIOLOGY | Age: 84
End: 2019-10-02

## 2019-10-02 VITALS
HEIGHT: 74 IN | DIASTOLIC BLOOD PRESSURE: 74 MMHG | WEIGHT: 236 LBS | SYSTOLIC BLOOD PRESSURE: 124 MMHG | BODY MASS INDEX: 30.29 KG/M2 | OXYGEN SATURATION: 92 % | HEART RATE: 74 BPM

## 2019-10-02 DIAGNOSIS — E78.00 PURE HYPERCHOLESTEROLEMIA: ICD-10-CM

## 2019-10-02 DIAGNOSIS — I25.10 CORONARY ARTERY DISEASE INVOLVING NATIVE CORONARY ARTERY OF NATIVE HEART WITHOUT ANGINA PECTORIS: Primary | ICD-10-CM

## 2019-10-02 PROCEDURE — 99214 OFFICE O/P EST MOD 30 MIN: CPT | Performed by: INTERNAL MEDICINE

## 2019-10-02 RX ORDER — NITROGLYCERIN 0.4 MG/1
0.4 TABLET SUBLINGUAL EVERY 5 MIN PRN
Qty: 90 TABLET | Refills: 12 | Status: SHIPPED | OUTPATIENT
Start: 2019-10-02

## 2019-10-02 ASSESSMENT — PATIENT HEALTH QUESTIONNAIRE - PHQ9
1. LITTLE INTEREST OR PLEASURE IN DOING THINGS: NOT AT ALL
SUM OF ALL RESPONSES TO PHQ9 QUESTIONS 1 AND 2: 0
SUM OF ALL RESPONSES TO PHQ9 QUESTIONS 1 AND 2: 0
2. FEELING DOWN, DEPRESSED OR HOPELESS: NOT AT ALL

## 2019-10-10 ENCOUNTER — TELEPHONE (OUTPATIENT)
Dept: CARDIOLOGY | Age: 84
End: 2019-10-10

## 2019-10-25 ENCOUNTER — TELEPHONE (OUTPATIENT)
Dept: CARDIOLOGY | Age: 84
End: 2019-10-25

## 2019-10-28 NOTE — TELEPHONE ENCOUNTER
brilinta 90 mg BID    Patient not seen at [facility]-ER cardiology clinic. Seen at AM. Refill denied and message sent to pharmacy.

## 2019-11-04 NOTE — DISCHARGE SUMMARY
[facility]    PATIENT'S NAME: Alexia Chen   ATTENDING PHYSICIAN: Kings Lee MD   PATIENT ACCOUNT#:   [id number]    LOCATION:  [address] [place] [address] Street #:   [id number]       YOB: 1935  ADMISSION DATE:       09/25/2019 and Dr. Savannah Mckay. PROCEDURES AND DATE:  Cardiac catheterization. DISCHARGE DIET, ACTIVITY, MEDICATIONS:  See discharge instructions.      ARRANGEMENT OF FUTURE CARE:  The patient will be followed by specialists as recommended and will see Dr. Yoshi Johnson

## 2019-11-09 NOTE — ED INITIAL ASSESSMENT (HPI)
States that he was helping his wife get up from the floor and felt a sever pain to the Lt hip/back area. States that he was unable to move X 20 min and then called the FD,. . States that the pain is non-radiating. No C/O incontinents. No trauma noted.  No sh

## 2019-11-09 NOTE — ED PROVIDER NOTES
Patient Seen in: [facility] AND [facility] Emergency Department    History   Patient presents with:  Hip Pain    Stated Complaint: hip pain     HPI    56-year-old male with past medical history of CKD, COPD on 5 L home oxygen, CAD, hypertension, dyslipidemia nora Inhalation Aerosol Powder, Breath Activated,  Inhale 1 puff into the lungs daily. Cholecalciferol (VITAMIN D) 2000 UNITS Oral Cap,  Take 2,000 Units by mouth daily. Acetaminophen (TYLENOL OR),  Take 500 mg by mouth daily.      Multiple Vitamins-Minera proximally and distally with 5/5 strength and with intact EHL. Skin: Skin is warm. Psychiatric: Cooperative. Nursing note and vitals reviewed.         ED Course   Labs Reviewed - No data to display  Preliminary Radiology Report  [facility] includes but is not limited to fracture, sprain/strain. Pulse ox: 94%:Normal on RA, as interpreted by myself    Evaluation for left lateral hip pain without cutaneous or crepitant change and without neurovascular compromise.   No associated back pain or

## 2019-11-09 NOTE — CM/SW NOTE
Called by Dr. Dolores Knight to arrange discharge transportation for patient due to hip pain and [facility] PT/OT.   Spoke with patient and daughter and per daughter patient's son is on his way here to assist with getting patient into car and other son will meet patient a

## 2019-11-09 NOTE — CM/SW NOTE
Ericka from [id number] [facility] called back - notified her of patient's need for [facility] PT/OT.   Per Eri Staff they are able to take patient, however first availability due to staffing to get PT/OT to patient's home is Monday - Ericka will call patient and infor

## 2019-11-12 NOTE — TELEPHONE ENCOUNTER
Chart reviewed. Pt is seen in [facility]. Pharmacy notified.

## 2019-12-16 RX ORDER — ISOSORBIDE MONONITRATE 30 MG/1
30 TABLET, EXTENDED RELEASE ORAL DAILY
Qty: 90 TABLET | Refills: 2 | Status: SHIPPED | OUTPATIENT
Start: 2019-12-16 | End: 2020-08-03

## 2019-12-23 LAB
ABSOLUTE IMMATURE GRANULOCYTES (OFFPRE24): NORMAL
ANION GAP SERPL CALC-SCNC: 8 MMOL/L
BASO+EOS+MONOS # BLD: NORMAL 10*3/UL
BASO+EOS+MONOS NFR BLD: NORMAL %
BASOPHILS # BLD: NORMAL 10*3/UL
BASOPHILS NFR BLD: NORMAL %
BUN SERPL-MCNC: 19 MG/DL
BUN/CREAT SERPL: 13.5
CALCIUM SERPL-MCNC: 8.9 MG/DL
CHLORIDE SERPL-SCNC: 114 MMOL/L
CO2 SERPL-SCNC: 22 MMOL/L
CREAT SERPL-MCNC: 1.41 MG/DL
DIFFERENTIAL METHOD BLD: NORMAL
EOSINOPHIL # BLD: NORMAL 10*3/UL
EOSINOPHIL NFR BLD: NORMAL %
ERYTHROCYTE [DISTWIDTH] IN BLOOD: NORMAL %
GLUCOSE SERPL-MCNC: 186 MG/DL
HCT VFR BLD CALC: 43.4 %
HGB BLD-MCNC: 14.5 G/DL
IMMATURE GRANULOCYTES (OFFPRE25): NORMAL
LENGTH OF FAST TIME PATIENT: NORMAL H
LYMPHOCYTES # BLD: NORMAL 10*3/UL
LYMPHOCYTES NFR BLD: NORMAL %
MCH RBC QN AUTO: NORMAL PG
MCHC RBC AUTO-ENTMCNC: NORMAL G/DL
MCV RBC AUTO: NORMAL FL
MONOCYTES # BLD: NORMAL 10*3/UL
MONOCYTES NFR BLD: NORMAL %
MPV (OFFPRE2): NORMAL
NEUTROPHILS # BLD: NORMAL 10*3/UL
NEUTROPHILS NFR BLD: NORMAL %
NRBC BLD MANUAL-RTO: NORMAL %
PLAT MORPH BLD: NORMAL
PLATELET # BLD: 182 10*3/UL
POTASSIUM SERPL-SCNC: 4.1 MMOL/L
RBC # BLD: 4.42 10*6/UL
RBC MORPH BLD: NORMAL
SODIUM SERPL-SCNC: 144 MMOL/L
WBC # BLD: 6.5 10*3/UL
WBC MORPH BLD: NORMAL

## 2019-12-23 RX ORDER — TICAGRELOR 90 MG/1
TABLET ORAL
Qty: 180 TABLET | Refills: 0 | Status: SHIPPED | OUTPATIENT
Start: 2019-12-23 | End: 2020-02-23 | Stop reason: SDUPTHER

## 2019-12-23 NOTE — ED PROVIDER NOTES
Patient Seen in: [facility] AND [facility] Emergency Department    History   Patient presents with:  Dyspnea ABDIAZIZ SOB    Stated Complaint:     HPI    HPI: Vy Gaytan is a 81year old male with a history of COPDwho presents with  A flare, notes sob.   No chest every 12 (twelve) hours. Metoprolol Succinate ER 50 MG Oral Tablet 24 Hr,  Take 50 mg by mouth daily. Cholecalciferol (VITAMIN D) 2000 UNITS Oral Cap,  Take 2,000 Units by mouth daily. Acetaminophen (TYLENOL OR),  Take 500 mg by mouth daily.      Mu no rash.   Neurologic: Alert & oriented x 3, no focal deficits  Psych: Calm, cooperative, nl affect        ED Course     Labs Reviewed   BASIC METABOLIC PANEL (8) - Abnormal; Notable for the following components:       Result Value    Glucose 186 (*)     Ch feeling better would like to go home. Will put on short course steroids. Has inhalers. States this feels nothing like when he was hospitalized for NSTEMI 2 months ago. Patient requesting to be discharged. States he feels fine.   He has been instructed

## 2019-12-23 NOTE — ED INITIAL ASSESSMENT (HPI)
Pt to ED via EMS c/o SOB while shopping today with portable O2 @ 5L. Pt placed on 15L NRB per medics and appears more comfortable. History of COPD. Denies CP.

## 2020-01-01 ENCOUNTER — CARDPULM VISIT (OUTPATIENT)
Dept: CARDIAC REHAB | Facility: HOSPITAL | Age: 85
End: 2020-01-01
Attending: INTERNAL MEDICINE
Payer: MEDICARE

## 2020-01-01 ENCOUNTER — TELEPHONE (OUTPATIENT)
Dept: RADIATION ONCOLOGY | Facility: HOSPITAL | Age: 85
End: 2020-01-01

## 2020-01-01 ENCOUNTER — HOSPITAL ENCOUNTER (OUTPATIENT)
Dept: NUCLEAR MEDICINE | Facility: HOSPITAL | Age: 85
Discharge: HOME | End: 2020-01-01
Attending: RADIOLOGY
Payer: MEDICARE

## 2020-01-01 ENCOUNTER — APPOINTMENT (OUTPATIENT)
Dept: RADIATION ONCOLOGY | Facility: HOSPITAL | Age: 85
End: 2020-01-01
Attending: RADIOLOGY
Payer: MEDICARE

## 2020-01-01 ENCOUNTER — LAB ENCOUNTER (OUTPATIENT)
Dept: LAB | Facility: HOSPITAL | Age: 85
End: 2020-01-01
Attending: RADIOLOGY
Payer: MEDICARE

## 2020-01-01 ENCOUNTER — DOCUMENTATION ONLY (OUTPATIENT)
Dept: RADIATION ONCOLOGY | Facility: HOSPITAL | Age: 85
End: 2020-01-01

## 2020-01-01 ENCOUNTER — TELEPHONE (OUTPATIENT)
Dept: HEMATOLOGY/ONCOLOGY | Facility: HOSPITAL | Age: 85
End: 2020-01-01

## 2020-01-01 ENCOUNTER — HOSPITAL ENCOUNTER (OUTPATIENT)
Dept: CT IMAGING | Facility: HOSPITAL | Age: 85
Discharge: HOME | End: 2020-01-01
Attending: INTERNAL MEDICINE
Payer: MEDICARE

## 2020-01-01 ENCOUNTER — LAB ENCOUNTER (OUTPATIENT)
Dept: LAB | Facility: HOSPITAL | Age: 85
End: 2020-01-01
Attending: INTERNAL MEDICINE
Payer: MEDICARE

## 2020-01-01 VITALS
SYSTOLIC BLOOD PRESSURE: 111 MMHG | HEART RATE: 67 BPM | DIASTOLIC BLOOD PRESSURE: 53 MMHG | OXYGEN SATURATION: 97 % | WEIGHT: 230 LBS | TEMPERATURE: 98 F | BODY MASS INDEX: 30 KG/M2 | RESPIRATION RATE: 16 BRPM

## 2020-01-01 VITALS
TEMPERATURE: 97 F | RESPIRATION RATE: 18 BRPM | WEIGHT: 228 LBS | OXYGEN SATURATION: 95 % | SYSTOLIC BLOOD PRESSURE: 139 MMHG | HEART RATE: 69 BPM | BODY MASS INDEX: 30 KG/M2 | DIASTOLIC BLOOD PRESSURE: 79 MMHG

## 2020-01-01 VITALS
DIASTOLIC BLOOD PRESSURE: 61 MMHG | TEMPERATURE: 98 F | HEART RATE: 66 BPM | OXYGEN SATURATION: 96 % | RESPIRATION RATE: 16 BRPM | SYSTOLIC BLOOD PRESSURE: 123 MMHG

## 2020-01-01 DIAGNOSIS — C34.11 PRIMARY MALIGNANT NEOPLASM OF RIGHT UPPER LOBE OF LUNG (HCC): Primary | ICD-10-CM

## 2020-01-01 DIAGNOSIS — R91.1 PULMONARY NODULE: Primary | ICD-10-CM

## 2020-01-01 DIAGNOSIS — Z77.21 EXPOSURE TO BLOOD: ICD-10-CM

## 2020-01-01 DIAGNOSIS — R91.1 PULMONARY NODULE: ICD-10-CM

## 2020-01-01 DIAGNOSIS — Z01.812 PRE-PROCEDURE LAB EXAM: Primary | ICD-10-CM

## 2020-01-01 LAB
M TB IFN-G CD4+ T-CELLS BLD-ACNC: 0.02 IU/ML
M TB TUBERC IFN-G BLD QL: NEGATIVE
M TB TUBERC IGNF/MITOGEN IGNF CONTROL: >10 IU/ML
QUANTIFERON TB1 MINUS NIL: 0.15 IU/ML
QUANTIFERON TB2 MINUS NIL: 0.25 IU/ML

## 2020-01-01 PROCEDURE — 71250 CT THORAX DX C-: CPT | Performed by: INTERNAL MEDICINE

## 2020-01-01 PROCEDURE — 77280 THER RAD SIMULAJ FIELD SMPL: CPT | Performed by: RADIOLOGY

## 2020-01-01 PROCEDURE — 77336 RADIATION PHYSICS CONSULT: CPT | Performed by: RADIOLOGY

## 2020-01-01 PROCEDURE — 86480 TB TEST CELL IMMUN MEASURE: CPT

## 2020-01-01 PROCEDURE — 77334 RADIATION TREATMENT AID(S): CPT | Performed by: RADIOLOGY

## 2020-01-01 PROCEDURE — 77373 STRTCTC BDY RAD THER TX DLVR: CPT | Performed by: RADIOLOGY

## 2020-01-01 PROCEDURE — 93798 PHYS/QHP OP CAR RHAB W/ECG: CPT

## 2020-01-01 PROCEDURE — 77300 RADIATION THERAPY DOSE PLAN: CPT | Performed by: RADIOLOGY

## 2020-01-01 PROCEDURE — 78815 PET IMAGE W/CT SKULL-THIGH: CPT | Performed by: RADIOLOGY

## 2020-01-01 PROCEDURE — 82962 GLUCOSE BLOOD TEST: CPT

## 2020-01-01 PROCEDURE — 99212 OFFICE O/P EST SF 10 MIN: CPT

## 2020-01-01 PROCEDURE — 36415 COLL VENOUS BLD VENIPUNCTURE: CPT

## 2020-01-01 RX ORDER — NYSTATIN 100000 U/G
1 CREAM TOPICAL
COMMUNITY

## 2020-01-01 RX ORDER — ALBUTEROL SULFATE 90 UG/1
2 AEROSOL, METERED RESPIRATORY (INHALATION) EVERY 6 HOURS PRN
COMMUNITY

## 2020-01-01 RX ORDER — ECHINACEA PURPUREA EXTRACT 125 MG
1 TABLET ORAL AS NEEDED
COMMUNITY

## 2020-01-02 NOTE — CONSULTS
Cardiology (consult dictated)    Assessment:  1. Episode of severe prolonged dyspnea, suspect anginal equivalent. 2. CAD, s/p remote CABG    3. COPD    4. CKD    Plan:  Admit  Heparin  Hydration  Cath tomorrow.     Thank you No

## 2020-01-08 ENCOUNTER — OFFICE VISIT (OUTPATIENT)
Dept: CARDIOLOGY | Age: 85
End: 2020-01-08

## 2020-01-08 VITALS
HEART RATE: 90 BPM | SYSTOLIC BLOOD PRESSURE: 138 MMHG | BODY MASS INDEX: 31.26 KG/M2 | RESPIRATION RATE: 18 BRPM | OXYGEN SATURATION: 88 % | DIASTOLIC BLOOD PRESSURE: 70 MMHG | HEIGHT: 72 IN | WEIGHT: 230.8 LBS

## 2020-01-08 DIAGNOSIS — I25.10 CORONARY ARTERY DISEASE INVOLVING NATIVE CORONARY ARTERY OF NATIVE HEART WITHOUT ANGINA PECTORIS: Primary | ICD-10-CM

## 2020-01-08 DIAGNOSIS — E78.00 PURE HYPERCHOLESTEROLEMIA: ICD-10-CM

## 2020-01-08 PROCEDURE — 99214 OFFICE O/P EST MOD 30 MIN: CPT | Performed by: INTERNAL MEDICINE

## 2020-01-08 ASSESSMENT — PATIENT HEALTH QUESTIONNAIRE - PHQ9
1. LITTLE INTEREST OR PLEASURE IN DOING THINGS: NOT AT ALL
SUM OF ALL RESPONSES TO PHQ9 QUESTIONS 1 AND 2: 0
2. FEELING DOWN, DEPRESSED OR HOPELESS: NOT AT ALL
SUM OF ALL RESPONSES TO PHQ9 QUESTIONS 1 AND 2: 0

## 2020-02-24 RX ORDER — TICAGRELOR 90 MG/1
TABLET ORAL
Qty: 180 TABLET | Refills: 1 | Status: SHIPPED | OUTPATIENT
Start: 2020-02-24 | End: 2020-08-14

## 2020-02-24 NOTE — TELEPHONE ENCOUNTER
LVMTCB TO MAKE CK CONSULT DX enlarging mixed density RUL nodule that is increasing in size. [address]  [address] [facility]

## 2020-03-05 NOTE — PROGRESS NOTES
Nursing Consultation Note  Patient: Meek Stiles  YOB: 1935  Age: 81year old  Radiation Oncologist: Dr. Nahum Cheney  Referring Physician: Edgar Cisneros  Diagnosis:No diagnosis found.   Consult Date: 3/5/2020      Chemotherapy: n Take 81 mg with Brilinta  0   • ticagrelor 90 MG Oral Tab Take 1 tablet (90 mg total) by mouth every 12 (twelve) hours. 60 tablet 3   • Metoprolol Succinate ER 50 MG Oral Tablet 24 Hr Take 50 mg by mouth daily.      • Cholecalciferol (VITAMIN D) 2000 UNITS Occupational History      Not on file    Social Needs      Financial resource strain: Not on file      Food insecurity:        Worry: Not on file        Inability: Not on file      Transportation needs:        Medical: Not on file        Non-medical: Not o Up and about more than 50% of waking hours. [place] [facility] [place] pet moving in 2 weeks. Education:  Yes    Are ADL's met? Yes  Does patient feel safe in their environment?   Yes  Care decisions:  Patient and/or surrogate IS involved in care decis Maxwell Solitario.  Medical history reviewed. Medications reviewed. VSS. Denies any pain. Wears 6LNC and at home he can go up to [id number] [employer]. Eating and drinking well. Pt and wife are moving to assisted living in 2 weeks.  Maxwell Solitario states his siblings and himself will dri

## 2020-03-05 NOTE — CONSULTS
[facility]    PATIENT'S NAME: Mague Carvajal   RADIATION ONCOLOGIST: Romina Paul.  Anant Petit MD   PATIENT ACCOUNT #: [de-identified] LOCATION: [address] [place] [address] RECORD #: [id number] YOB: 1935   CONSULTATION DATE:        RADIATION ONCO bladder malignancy. PAST SURGICAL HISTORY:  Quadruple bypass, carotid endarterectomy, tonsillectomy. MEDICATIONS:  Acetaminophen, Anoro Ellipta, aspirin, atorvastatin, isosorbide mononitrate, lisinopril, metoprolol, and ticagrelor.     ALLERGIES: the right upper lobe. He has very poor pulmonary functioning and is oxygen dependent with a requirement of between 6 to 12L.   He is not a candidate for biopsy or surgery and was referred to discuss the possibility of ablative radiotherapy to this suspicio with treatment as I previously dictated. We, therefore, will schedule the patient for simulation soon with the intent to begin his treatment shortly thereafter.   I told him that he will continue to follow very closely with Dr. Yolanda Wen to manage his

## 2020-04-23 NOTE — TELEPHONE ENCOUNTER
Abilio's son Kenrick Caro called saying that Satnam Santoro and his wife are now living in assisted living, but were told that they could not be accepted to live there if he was doing [employer].  He figured assisted living was more important, so decided to forgo the tr

## 2020-05-01 NOTE — TELEPHONE ENCOUNTER
Demetrio Sheldon called and was looking to speak with a nurse to go over his CT sim schedule and see how long he can put it off with everything that is going on. He asked that he get a call back from a nurse to go over this.

## 2020-05-01 NOTE — TELEPHONE ENCOUNTER
Pt called back and reassured he can come for ct sim after lockdown is clear at the facility he is at.

## 2020-06-03 NOTE — TELEPHONE ENCOUNTER
Dr. Zeyad Nielsen stated it is not recommendable for pt to have tx until August.  Made pt aware of MD recommendations. He will call his son and make arrangements for a ride for ct sim. Pt has my direct line and will call me back this week to schedule.

## 2020-06-03 NOTE — PROGRESS NOTES
5/11/2020 Jose Rafael Obrien the nurse called me back states pt is actually moving out of the facility. I have called son Apple Fitzgerald for an update msg left. 6/3/2020 Pt states he is still on lockdown at [facility].   I asked him if he would like for m

## 2020-06-03 NOTE — TELEPHONE ENCOUNTER
Pt aware he will need a pet scan and I will help him coordinate that appt. He will call me back with dates of availability.

## 2020-06-04 NOTE — TELEPHONE ENCOUNTER
Shannon Granados to arrange pet scan for 6/11 or 6/12  with son Hanh Chang at [phone]. Markcarleen Charlene aware he will get a call.

## 2020-06-22 NOTE — PROGRESS NOTES
Made son and pt aware pt may need covid-19 test prior to initiation of RT. They agreed if needed to do it.

## 2020-06-22 NOTE — PROGRESS NOTES
Nursing Consultation Note  Patient: Tom Delcid  YOB: 1935  Age: 81year old  Radiation Oncologist: Dr. Damian Jaiver  Referring Physician: Alisha Ledesma  Diagnosis:No diagnosis found.   Consult Date: 3/5/2020   Reconsult 6/22/2020    Tab Take 1 tablet (80 mg total) by mouth nightly. 90 tablet 1   • aspirin 81 MG Oral Tab EC Take 1 tablet (81 mg total) by mouth daily. Take 81 mg with Brilinta   0   • ticagrelor 90 MG Oral Tab Take 1 tablet (90 mg total) by mouth every 12 (twelve) hours. Socioeconomic History      Marital status:       Spouse name: Not on file      Number of children: Not on file      Years of education: Not on file      Highest education level: Not on file    Occupational History      Not on file    Social Needs The patient does not use an assistive device. .        The patient does live in a home with stairs.         ECOG:  Grade 2 - Ambulatory/capable of all self-care, unable to perform any work activities. Up and about more than 50% of waking hours.      Educ fall  Knowledge of care plan     Progress Toward Outcome:  Making progress     Pamphlets/Handouts Given to Patient:  Home safety sheet.                 Pt came in accompanied by son Carolina Riggs.   Pt to review pet scan results with Dr. Yossi Espinoza as he is covering for

## 2020-06-22 NOTE — PROGRESS NOTES
RADIATION ONCOLOGY NOTE    DATE OF VISIT: 6/22/2020    REFERRING PHYSICIAN:  Dr. Tae Berg. 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       As you recall, patient is an 25-year-old male, Oncology  [email or web address]  [phone]    6/22/2020            PROCEDURE:  PET/CT STANDARD BODY SCAN (FON=[id number])     COMPARISON: [facility], [place], [place] CHEST (CPT=[id number]), 2/17/2020, 9:11 AM.     INDICATIONS:               R91.1 Solit a hypermetabolic subcarinal lymph node measuring 8 x 10 mm (image 124). The SUV max measures 3.4. The differential diagnosis is reactive lymph node versus metastatic disease. Follow-up is recommended.                     There are extensive coronary kuldeep note.         Current Outpatient Medications   Medication Sig Dispense Refill   • Albuterol Sulfate  (90 Base) MCG/ACT Inhalation Aero Soln Inhale 2 puffs into the lungs every 6 (six) hours as needed for Wheezing.      • nystatin 430313 UNIT/GM Exter history of malignant neoplasm of bladder, Special screening for malignant neoplasm of prostate, and Unspecified essential hypertension. PAST SURGICAL HISTORY:   has a past surgical history that includes bypass surgery (quadruple bypass);  Carotid endarte

## 2020-07-15 NOTE — PROGRESS NOTES
[facility] Radiation Treatment Management Note 1-5    Patient:  Jose Carlos Mittal  Age:  81year old  Visit Diagnosis:  Probable NSCLC of the R lung, cT1c N0  Primary Rad/Onc:  Dr. Akosua Hendricks    Site Delivered Dose (Gy) Prescribed D

## 2020-07-15 NOTE — PATIENT INSTRUCTIONS
Follow up with Dr. George Peralta in 4 weeks. At this time Dr. George Peralta to talk to you about follow up scans. Call [phone] to schedule a follow up appointment.

## 2020-07-17 NOTE — TELEPHONE ENCOUNTER
Please call robert Barney to schedule appts: [phone], per message patient live in a facility and need a virtual phone visit and verbal consent for phone visit  [facility].

## 2020-07-18 NOTE — PROGRESS NOTES
[facility]    PATIENT'S NAME: Pieter Sprague   RADIATION ONCOLOGIST: Ramakrishna Molina.  Hermila Malik MD   PATIENT ACCOUNT #: [de-identified] LOCATION: [address] [place] [address] RECORD #: [id number] YOB: 1935   DATE: 07/15/2020       RADIATION ONCOLOG [employer]. There were some meaningful delays in getting him to therapy as he was quite reluctant to come out during the [place] pandemic. We ultimately pressed upon him the importance of having treatments for his probable lung malignancy.   I did get a PET

## 2020-08-03 RX ORDER — ISOSORBIDE MONONITRATE 30 MG/1
30 TABLET, EXTENDED RELEASE ORAL DAILY
Qty: 90 TABLET | Refills: 2 | Status: SHIPPED | OUTPATIENT
Start: 2020-08-03

## 2020-08-14 ENCOUNTER — APPOINTMENT (OUTPATIENT)
Dept: RADIATION ONCOLOGY | Facility: HOSPITAL | Age: 85
End: 2020-08-14
Attending: RADIOLOGY
Payer: MEDICARE

## 2020-08-17 ENCOUNTER — TELEPHONE (OUTPATIENT)
Dept: CARDIOLOGY | Age: 85
End: 2020-08-17

## 2020-08-18 ENCOUNTER — TELEPHONE (OUTPATIENT)
Dept: CARDIOLOGY | Age: 85
End: 2020-08-18

## 2020-09-09 ENCOUNTER — TELEPHONE (OUTPATIENT)
Dept: RADIATION ONCOLOGY | Facility: HOSPITAL | Age: 85
End: 2020-09-09

## (undated) NOTE — Clinical Note
I saw Nickolas Araujo today in COPD clinic.  Cough decreased, no wheezing , + hitchcock with stairs, less hitchcock with walking, continued on 5 L n/c with exertion /sleep with continued chronic hypoxemia on RA with exertion, 02 sat 86% on RA after 100 feet, he denies cp, hear

## (undated) NOTE — LETTER
[address], [place]  Authorization for Invasive Procedures  1.  I hereby authorize Dr. Magdaleno Posada , my physician and whomever may be designated as the doctor's assistant, to perform the following operation and/or procedure:  Cardiac 5. I consent to the photographing of the operations or procedures to be performed for the purposes of advancing medicine, science, and/or education, provided my identity is not revealed.  If the procedure has been videotaped, the physician/surgeon will obta __________ Time: ___________    Statement of Physician  My signature below affirms that prior to the time of the procedure, I have explained to the patient and/or his legal representative, the risks and benefits involved in the proposed treatment and any r

## (undated) NOTE — ED AVS SNAPSHOT
Eugenio Nunez   MRN: [id number]    Department:  [facility] Emergency Department   Date of Visit:  11/8/2019           Disclosure     Insurance plans vary and the physician(s) referred by the ER may not be covered by your plan.  Please contact y within the next three months to obtain basic health screening including reassessment of your blood pressure.     IF THERE IS ANY CHANGE OR WORSENING OF YOUR CONDITION, CALL YOUR PRIMARY CARE PHYSICIAN AT ONCE OR RETURN IMMEDIATELY TO THE EMERGENCY DEPARTMEN

## (undated) NOTE — Clinical Note
I saw Lizzbrian Reyes today in COPD clinic. Cough decreased, no wheezing , hitchcock with stairs, less hitchcock with walking, continued on 5 L n/c with exertion and sleep, denies cp, heart racing or dizziness. No sign of fluid overload.  He will start cardiac rehab 2/26 and s

## (undated) NOTE — ED AVS SNAPSHOT
Keli Vargas   MRN: [id number]    Department:  [facility] Emergency Department   Date of Visit:  12/23/2019           Disclosure     Insurance plans vary and the physician(s) referred by the ER may not be covered by your plan.  Please contact within the next three months to obtain basic health screening including reassessment of your blood pressure.     IF THERE IS ANY CHANGE OR WORSENING OF YOUR CONDITION, CALL YOUR PRIMARY CARE PHYSICIAN AT ONCE OR RETURN IMMEDIATELY TO THE EMERGENCY DEPARTMEN

## (undated) NOTE — Clinical Note
I saw Laineyalphonse Tracey today after copd exac and NSTEMI. Cough improving, no wheezing , hitchcock going up a flight of stairs, continued chronic hypoxemia with exertion, continued on 5 L n/c with exertion and sleep. He sees you 2/18.  Plan for him to start cardiac rehab 2